# Patient Record
Sex: FEMALE | Race: ASIAN | NOT HISPANIC OR LATINO | URBAN - METROPOLITAN AREA
[De-identification: names, ages, dates, MRNs, and addresses within clinical notes are randomized per-mention and may not be internally consistent; named-entity substitution may affect disease eponyms.]

---

## 2023-06-12 ENCOUNTER — EMERGENCY (EMERGENCY)
Facility: HOSPITAL | Age: 23
LOS: 0 days | Discharge: HOME | End: 2023-06-13
Attending: EMERGENCY MEDICINE
Payer: MEDICAID

## 2023-06-12 VITALS
HEART RATE: 102 BPM | RESPIRATION RATE: 20 BRPM | OXYGEN SATURATION: 100 % | DIASTOLIC BLOOD PRESSURE: 76 MMHG | TEMPERATURE: 99 F | WEIGHT: 119.93 LBS | SYSTOLIC BLOOD PRESSURE: 123 MMHG

## 2023-06-12 DIAGNOSIS — L50.9 URTICARIA, UNSPECIFIED: ICD-10-CM
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
## 2023-06-12 DIAGNOSIS — T78.40XA ALLERGY, UNSPECIFIED, INITIAL ENCOUNTER: ICD-10-CM

## 2023-06-12 DIAGNOSIS — Z98.890 OTHER SPECIFIED POSTPROCEDURAL STATES: ICD-10-CM

## 2023-06-12 PROCEDURE — 99283 EMERGENCY DEPT VISIT LOW MDM: CPT

## 2023-06-12 PROCEDURE — 99284 EMERGENCY DEPT VISIT MOD MDM: CPT

## 2023-06-12 NOTE — ED PROVIDER NOTE - PHYSICAL EXAMINATION
CONSTITUTIONAL:  NAD;   SKIN: + urticarial rash across b/l arms and torso, otherwise skin intact, warm, dry;   HEAD:  NCAT;   EYES:  NL inspection;   ENT: posterior oropharynx clear, no edema, no exudates, uvula midline, airway patent, no stridor, no intraoral swelling, MMM;   NECK: supple; normal ROM; no swelling  CARD: no cyanosis  RESP:  CTAB; no increased work of breathing  ABD:  S/NT, no R/G;   MSK:  no extremity injury/deformity;   NEURO:  grossly unremarkable;   PSYCH:  cooperative, appropriate;

## 2023-06-12 NOTE — ED PROVIDER NOTE - PATIENT PORTAL LINK FT
You can access the FollowMyHealth Patient Portal offered by [facility] by registering at the following website: [email or web address]. By joining [employer]’s FollowMyHealth portal, you will also be able to view your health information using other applications (apps) compatible with our system.

## 2023-06-12 NOTE — ED PROVIDER NOTE - ATTENDING CONTRIBUTION TO CARE
Patient ready care after taking Keflex first time.  No other systemic involvement.  Well-appearing superficial urticaria otherwise exam unremarkable.    Patient seen with resident agree with above

## 2023-06-12 NOTE — ED PROVIDER NOTE - CARE PLAN
Assessment and plan of treatment:	Allergic reaction/urticaria    Supportive care change antibiotic follow-up outpatient return if worse   1 Principal Discharge DX:	Urticarial rash  Assessment and plan of treatment:	Allergic reaction/urticaria    Supportive care change antibiotic follow-up outpatient return if worse

## 2023-06-12 NOTE — ED PROVIDER NOTE - OBJECTIVE STATEMENT
Patient is a healthy 30 F with PMHx of recently diagnosed left great toe ingrown nail at outside [facility] (started on cephalexin, took 3 doses yesterday and today), otherwise healthy, p/w urticaria rash on bilateral arms and torso beginning tonight. Patient denies GERD pain, swelling, difficulty breathing or swallowing, abdominal pain, nausea/vomiting/diarrhea, or syncope.

## 2023-06-12 NOTE — ED PROVIDER NOTE - CLINICAL SUMMARY MEDICAL DECISION MAKING FREE TEXT BOX
Allergic reaction/urticaria    Supportive care change antibiotic follow-up outpatient return if worse

## 2023-06-12 NOTE — ED PROVIDER NOTE - NSFOLLOWUPINSTRUCTIONS_ED_ALL_ED_FT
Hives (Urticaria)    WHAT YOU NEED TO KNOW:    Urticaria is also called hives. Hives can change size and shape, and appear anywhere on your skin. They can be mild or severe and last from a few minutes to a few days. Hives may be a sign of a severe allergic reaction called anaphylaxis that needs immediate treatment. Urticaria that lasts longer than 6 weeks may be a chronic condition that needs long-term treatment.      DISCHARGE INSTRUCTIONS:    Call 911 for signs or symptoms of anaphylaxis, such as trouble breathing, swelling in your mouth or throat, or wheezing. You may also have itching, a rash, or feel like you are going to faint.    Return to the emergency department if:     Your heart is beating faster than it normally does.    You have cramping or severe pain in your abdomen.    Contact your healthcare provider if:     You have a fever.    Your skin still itches 24 hours after you take your medicine.    You still have hives after 7 days.    Your joints are painful and swollen.    You have questions or concerns about your condition or care.    Medicines:     Epinephrine is used to treat severe allergic reactions such as anaphylaxis.    Antihistamines decrease mild symptoms such as itching or a rash.    Steroids decrease redness, pain, and swelling.    Take your medicine as directed. Contact your healthcare provider if you think your medicine is not helping or if you have side effects. Tell him of her if you are allergic to any medicine. Keep a list of the medicines, vitamins, and herbs you take. Include the amounts, and when and why you take them. Bring the list or the pill bottles to follow-up visits. Carry your medicine list with you in case of an emergency.    Steps to take for signs or symptoms of anaphylaxis:     Immediately give 1 shot of epinephrine only into the outer thigh muscle.     Leave the shot in place as directed. Your healthcare provider may recommend you leave it in place for up to 10 seconds before you remove it. This helps make sure all of the epinephrine is delivered.     Call 911 and go to the emergency department, even if the shot improved symptoms. Do not drive yourself. Bring the used epinephrine shot with you.     Safety precautions to take if you are at risk for anaphylaxis:     Keep 2 shots of epinephrine with you at all times. You may need a second shot, because epinephrine only works for about 20 minutes and symptoms may return. Your healthcare provider can show you and family members how to give the shot. Check the expiration date every month and replace it before it expires.    Create an action plan. Your healthcare provider can help you create a written plan that explains the allergy and an emergency plan to treat a reaction. The plan explains when to give a second epinephrine shot if symptoms return or do not improve after the first. Give copies of the action plan and emergency instructions to family members, work and school staff, and  providers. Show them how to give a shot of epinephrine.    Be careful when you exercise. If you have had exercise-induced anaphylaxis, do not exercise right after you eat. Stop exercising right away if you start to develop any signs or symptoms of anaphylaxis. You may first feel tired, warm, or have itchy skin. Hives, swelling, and severe breathing problems may develop if you continue to exercise.    Carry medical alert identification. Wear medical alert jewelry or carry a card that explains the allergy. Ask your healthcare provider where to get these items. Medical Alert Jewelry    Keep a record of triggers and symptoms. Record everything you eat, drink, or apply to your skin for 3 weeks. Include stressful events and what you were doing right before your hives started. Bring the record with you to follow-up visits with your healthcare provider.    Manage urticaria:     Cool your skin. This may help decrease itching. Apply a cool pack to your hives. Dip a hand towel in cool water, wring it out, and place it on your hives. You may also soak your skin in a cool oatmeal bath.    Do not rub your hives. This can irritate your skin and cause more hives.    Wear loose clothing. Tight clothes may irritate your skin and cause more hives.    Manage stress. Stress may trigger hives, or make them worse. Learn new ways to relax, such as deep breathing.     Follow up with your healthcare provider as directed: Write down your questions so you remember to ask them during your visits.

## 2023-06-12 NOTE — ED PROVIDER NOTE - NSFOLLOWUPCLINICS_GEN_ALL_ED_FT
[facility]  Medicine  [address], [place]   Phone: [phone]  Fax:   Follow Up Time: Routine

## 2023-06-12 NOTE — ED ADULT TRIAGE NOTE - CHIEF COMPLAINT QUOTE
pt c/o rash to b/l arms, chest and back after taking cephalexin for the first time   denies diff breathing/swallowing, airway patent

## 2023-06-13 RX ORDER — DIPHENHYDRAMINE HCL 50 MG
50 CAPSULE ORAL ONCE
Refills: 0 | Status: COMPLETED | OUTPATIENT
Start: 2023-06-13 | End: 2023-06-13

## 2023-06-13 RX ADMIN — Medication 50 MILLIGRAM(S): at 00:29

## 2023-06-13 NOTE — ED ADULT NURSE NOTE - BIRTH SEX
Pt BIB mother for below complaitn.   Chief Complaint   Patient presents with   • Syncope     Pt was brushing her teeth and there was blood in the sink from losing her tooth this am. PT grabbed her head and fell backwards. PT hit back of head, skin intact. mother states pt had tremors and was not able to see for a couple of minutes. Pt had one bout of emesis after event. PERR     /49   Pulse 78   Temp 36.7 °C (98.1 °F) (Temporal)   Resp 22   Wt 22.6 kg (49 lb 13.2 oz)   SpO2 98%   Triage complete. Pt given zofran. Pt/Family educated on NPO status. Pt is alert, active, and age appropriate, NAD. Family educated on wait time and to update triage nurse with any changes.      Female

## 2023-06-13 NOTE — ED ADULT NURSE NOTE - NSFALLUNIVINTERV_ED_ALL_ED
Bed/Stretcher in lowest position, wheels locked, appropriate side rails in place/Call bell, personal items and telephone in reach/Instruct patient to call for assistance before getting out of bed/chair/stretcher/Non-slip footwear applied when patient is off stretcher/[place] to call system/Physically safe environment - no spills, clutter or unnecessary equipment/Purposeful proactive rounding/Room/bathroom lighting operational, light cord in reach

## 2023-09-08 ENCOUNTER — EMERGENCY (EMERGENCY)
Facility: HOSPITAL | Age: 23
LOS: 0 days | Discharge: HOME | End: 2023-09-08
Attending: EMERGENCY MEDICINE
Payer: MEDICAID

## 2023-09-08 VITALS
TEMPERATURE: 99 F | DIASTOLIC BLOOD PRESSURE: 84 MMHG | HEIGHT: 60 IN | SYSTOLIC BLOOD PRESSURE: 120 MMHG | HEART RATE: 115 BPM | OXYGEN SATURATION: 100 % | WEIGHT: 119.93 LBS | RESPIRATION RATE: 18 BRPM

## 2023-09-08 DIAGNOSIS — R31.9 HEMATURIA, UNSPECIFIED: ICD-10-CM

## 2023-09-08 DIAGNOSIS — N39.0 URINARY TRACT INFECTION, SITE NOT SPECIFIED: ICD-10-CM

## 2023-09-08 DIAGNOSIS — R30.0 DYSURIA: ICD-10-CM

## 2023-09-08 PROBLEM — Z78.9 OTHER SPECIFIED HEALTH STATUS: Chronic | Status: ACTIVE | Noted: 2023-06-13

## 2023-09-08 LAB
APPEARANCE UR: ABNORMAL
BACTERIA # UR AUTO: NEGATIVE /HPF — SIGNIFICANT CHANGE UP
BILIRUB UR-MCNC: NEGATIVE — SIGNIFICANT CHANGE UP
CAST: 0 /LPF — SIGNIFICANT CHANGE UP (ref 0–4)
COLOR SPEC: ABNORMAL
DIFF PNL FLD: ABNORMAL
GLUCOSE UR QL: NEGATIVE MG/DL — SIGNIFICANT CHANGE UP
KETONES UR-MCNC: NEGATIVE MG/DL — SIGNIFICANT CHANGE UP
LEUKOCYTE ESTERASE UR-ACNC: ABNORMAL
NITRITE UR-MCNC: NEGATIVE — SIGNIFICANT CHANGE UP
PH UR: 6 — SIGNIFICANT CHANGE UP (ref 5–8)
PROT UR-MCNC: 300 MG/DL
RBC CASTS # UR COMP ASSIST: 1253 /HPF — HIGH (ref 0–4)
SP GR SPEC: 1.01 — SIGNIFICANT CHANGE UP (ref 1–1.03)
SQUAMOUS # UR AUTO: 1 /HPF — SIGNIFICANT CHANGE UP (ref 0–5)
UROBILINOGEN FLD QL: 0.2 MG/DL — SIGNIFICANT CHANGE UP (ref 0.2–1)
WBC UR QL: 655 /HPF — HIGH (ref 0–5)

## 2023-09-08 PROCEDURE — 99283 EMERGENCY DEPT VISIT LOW MDM: CPT

## 2023-09-08 PROCEDURE — 87086 URINE CULTURE/COLONY COUNT: CPT

## 2023-09-08 PROCEDURE — 99284 EMERGENCY DEPT VISIT MOD MDM: CPT

## 2023-09-08 PROCEDURE — 81001 URINALYSIS AUTO W/SCOPE: CPT

## 2023-09-08 RX ORDER — CEFPODOXIME PROXETIL 100 MG
1 TABLET ORAL
Qty: 28 | Refills: 0
Start: 2023-09-08 | End: 2023-09-21

## 2023-09-08 RX ORDER — CEFPODOXIME PROXETIL 100 MG
1 TABLET ORAL
Qty: 20 | Refills: 0
Start: 2023-09-08 | End: 2023-09-17

## 2023-09-08 NOTE — ED ADULT NURSE NOTE - NS ED PATIENT SAFETY CONCERN
Occupational Therapy  Patient not seen in therapy today.     Pt not seen- awaiting post op shoe and also pending d/c back to [facility] today       OBJECTIVE                   Documented in the chart in the following areas: Assessment. Plan.      Therapy procedure time and total treatment time can be found documented on the Time Entry flowsheet   No

## 2023-09-08 NOTE — ED ADULT TRIAGE NOTE - CHIEF COMPLAINT QUOTE
Patient complaining of vaginal bleeding x1week and now associated with pelvic pain. Patient denies clots, heavy bleeding and dizziness. LMP 08/19.

## 2023-09-08 NOTE — ED PROVIDER NOTE - PATIENT PORTAL LINK FT
You can access the FollowMyHealth Patient Portal offered by [facility] by registering at the following website: [email or web address]. By joining [employer]’s FollowMyHealth portal, you will also be able to view your health information using other applications (apps) compatible with our system.

## 2023-09-08 NOTE — ED ADULT NURSE NOTE - NSFALLUNIVINTERV_ED_ALL_ED
Bed/Stretcher in lowest position, wheels locked, appropriate side rails in place/Call bell, personal items and telephone in reach/Instruct patient to call for assistance before getting out of bed/chair/stretcher/Non-slip footwear applied when patient is off stretcher/[place] to call system/Physically safe environment - no spills, clutter or unnecessary equipment/Purposeful proactive rounding/Room/bathroom lighting operational, light cord in reach

## 2023-09-08 NOTE — ED PROVIDER NOTE - OBJECTIVE STATEMENT
3-year-old female no notable past medical history coming in with complaints of blood in her urine.  Patient reports that for the past 5 days she has had some dysuria and suprapubic pain while she was visiting [facility], diagnosed with UTI and given cephalexin and metronidazole which helped her symptoms, however they returned after a couple of days when she became dehydrated.  Now with some blood/pink streaks in her urine.  Denies any sexual activity.  LMP 8/16.

## 2023-09-08 NOTE — ED PROVIDER NOTE - ATTENDING CONTRIBUTION TO CARE
23-year-old female presents for evaluation of blood in urine.  States for the past few days she has been having dysuria and suprapubic discomfort, diagnosed with UTI and given cephalexin and metronidazole which improved symptoms.  Patient states she took on and off but did not take them as directed preferring to take once a day instead of as prescribed.  States she just returned from [place] and symptoms have returned after couple days.  Feels like she is dehydrated and has some pink streaks in her urine when wiping.  Denies any sexual activity or history of STI.  No falls or trauma.  LMP 8/16/2023.  Denies any abdominal pain, flank pain, fevers, chills, joint pain, rashes or weakness.  Tolerating p.o. as usual with normal appetite.     VITAL SIGNS: noted  CONSTITUTIONAL: Well-developed; well-nourished; in no acute distress  HEAD: Normocephalic; atraumatic  EYES: PERRL, EOM intact; conjunctiva and sclera clear  ENT: No nasal discharge; airway clear. MMM  NECK: Supple; non tender. No anterior cervical lymphadenopathy noted  CARD: S1, S2 normal; no murmurs, gallops, or rubs. Regular rate and rhythm  RESP: CTAB/L, no wheezes, rales or rhonchi  ABD: Normal bowel sounds; soft; non-distended; non-tender; no hepatosplenomegaly. No CVA tenderness  EXT: Normal ROM. No calf tenderness or edema. Distal pulses intact  NEURO: Alert, oriented. Grossly unremarkable. No focal deficits  SKIN: Skin exam is warm and dry, no acute rash  MS: No midline spinal tenderness

## 2023-09-08 NOTE — ED PROVIDER NOTE - CLINICAL SUMMARY MEDICAL DECISION MAKING FREE TEXT BOX
Patient evaluated for dysuria, found to have symptoms of UTI on urinalysis.  Culture sent.  Patient prescribed medication and advised to take as directed and finish complete course.  Recommended close follow-up with PMD for reevaluation and patient agreed.  Strict return precautions advised and patient verbalized understanding.

## 2023-09-08 NOTE — ED PROVIDER NOTE - NSFOLLOWUPINSTRUCTIONS_ED_ALL_ED_FT
Please follow-up with PCP in 1 to 3 days. Return precautions explained in full to patient/family.    Urinary Tract Infection    You were seen and evelauted in the Emergency Department. It was found to you have a Urinary Tract Infection (UTI). It has been determined that it is safe for you to be discharged and attempt outpatient management. Please take antibiotic as prescribed and monitor your symptoms. If your antibiotic needs to be changed based on test results, you will be contacted. There is a chance you may have to return for re-evaluation and possible admission if oral antibiotics are not working. Please monitor your symptoms and see how you feel after 48 hours of antibiotics, unless one of the strict return precautions we discussed happens. Follow up with your doctor and bring all of your results. You may require further work up and management, which may include evaluaiton by a specialist (Urologist)    Overview  A urinary tract infection, or UTI, is a general term for an infection anywhere between the kidneys and the urethra (where urine comes out). Most UTIs are bladder infections. They often cause pain or burning when you urinate.    UTIs are caused by bacteria and can be cured with antibiotics. Be sure to complete your treatment so that the infection does not get worse.    Follow-up care is a key part of your treatment and safety. Be sure to make and go to all appointments, and call your doctor or nurse call line if you are having problems. It's also a good idea to know your test results and keep a list of the medicines you take.    How can you care for yourself at home?  Take your antibiotics as directed. Do not stop taking them just because you feel better. You need to take the full course of antibiotics.  Drink extra water and other fluids for the next day or two. This will help make the urine less concentrated and help wash out the bacteria that are causing the infection. (If you have kidney, heart, or liver disease and have to limit fluids, talk with your doctor before you increase the amount of fluids you drink.)  Avoid drinks that are carbonated or have caffeine. They can irritate the bladder.  Urinate often. Try to empty your bladder each time.  To relieve pain, take a hot bath or lay a heating pad set on low over your lower belly or genital area. Never go to sleep with a heating pad in place.  To prevent UTIs  Drink plenty of water each day. This helps you urinate often, which clears bacteria from your system. (If you have kidney, heart, or liver disease and have to limit fluids, talk with your doctor before you increase the amount of fluids you drink.)  Urinate when you need to.  If you are sexually active, urinate right after you have sex.  Change sanitary pads often.  Avoid douches, bubble baths, feminine hygiene sprays, and other feminine hygiene products that have deodorants.  After you use the toilet, wipe from front to back.  When should you call for help?  	  Call your doctor or nurse call line now or seek immediate medical care if:    Symptoms such as fever, chills, nausea, or vomiting get worse or appear for the first time.  You have new pain in your back just below your rib cage. This is called flank pain.  There is new blood or pus in your urine.  You have any problems with your antibiotic medicine.  Watch closely for changes in your health, and be sure to contact your doctor or nurse call line if:    You do not feel better after 2 days on an antibiotic.  Your symptoms go away but then come back.

## 2023-09-08 NOTE — ED PROVIDER NOTE - PHYSICAL EXAMINATION
CONSTITUTIONAL: NAD  SKIN: Warm dry  HEAD: NCAT  EYES: NL inspection  ENT: MMM  NECK: Supple; non tender.  CARD: RRR  RESP: CTAB  ABD: S/NT no R/G, no CVAT  NEURO: Grossly unremarkable  PSYCH: Cooperative, appropriate.

## 2023-09-09 LAB
CULTURE RESULTS: SIGNIFICANT CHANGE UP
SPECIMEN SOURCE: SIGNIFICANT CHANGE UP

## 2024-07-06 ENCOUNTER — INPATIENT (INPATIENT)
Facility: HOSPITAL | Age: 24
LOS: 3 days | Discharge: HOME | DRG: 424 | End: 2024-07-10
Attending: INTERNAL MEDICINE | Admitting: INTERNAL MEDICINE
Payer: MEDICAID

## 2024-07-06 VITALS
RESPIRATION RATE: 18 BRPM | WEIGHT: 110.01 LBS | DIASTOLIC BLOOD PRESSURE: 75 MMHG | TEMPERATURE: 98 F | HEART RATE: 112 BPM | SYSTOLIC BLOOD PRESSURE: 106 MMHG | OXYGEN SATURATION: 99 %

## 2024-07-06 PROCEDURE — 99285 EMERGENCY DEPT VISIT HI MDM: CPT

## 2024-07-06 RX ORDER — FAMOTIDINE 40 MG
20 TABLET ORAL ONCE
Refills: 0 | Status: COMPLETED | OUTPATIENT
Start: 2024-07-06 | End: 2024-07-06

## 2024-07-06 RX ORDER — SODIUM CHLORIDE 0.9 % (FLUSH) 0.9 %
2000 SYRINGE (ML) INJECTION ONCE
Refills: 0 | Status: COMPLETED | OUTPATIENT
Start: 2024-07-06 | End: 2024-07-06

## 2024-07-06 RX ORDER — ONDANSETRON HYDROCHLORIDE 2 MG/ML
4 INJECTION INTRAMUSCULAR; INTRAVENOUS ONCE
Refills: 0 | Status: COMPLETED | OUTPATIENT
Start: 2024-07-06 | End: 2024-07-07

## 2024-07-07 DIAGNOSIS — E87.1 HYPO-OSMOLALITY AND HYPONATREMIA: ICD-10-CM

## 2024-07-07 LAB
ALBUMIN SERPL ELPH-MCNC: 4 G/DL — SIGNIFICANT CHANGE UP (ref 3.5–5.2)
ALBUMIN SERPL ELPH-MCNC: 5 G/DL — SIGNIFICANT CHANGE UP (ref 3.5–5.2)
ALP SERPL-CCNC: 61 U/L — SIGNIFICANT CHANGE UP (ref 30–115)
ALP SERPL-CCNC: 68 U/L — SIGNIFICANT CHANGE UP (ref 30–115)
ALT FLD-CCNC: 42 U/L — HIGH (ref 0–41)
ALT FLD-CCNC: 48 U/L — HIGH (ref 0–41)
ANION GAP SERPL CALC-SCNC: 13 MMOL/L — SIGNIFICANT CHANGE UP (ref 7–14)
ANION GAP SERPL CALC-SCNC: 14 MMOL/L — SIGNIFICANT CHANGE UP (ref 7–14)
ANION GAP SERPL CALC-SCNC: 15 MMOL/L — HIGH (ref 7–14)
ANION GAP SERPL CALC-SCNC: 17 MMOL/L — HIGH (ref 7–14)
ANION GAP SERPL CALC-SCNC: 19 MMOL/L — HIGH (ref 7–14)
APPEARANCE UR: CLEAR — SIGNIFICANT CHANGE UP
APTT BLD: 35 SEC — SIGNIFICANT CHANGE UP (ref 27–39.2)
APTT BLD: 37.4 SEC — SIGNIFICANT CHANGE UP (ref 27–39.2)
AST SERPL-CCNC: 44 U/L — HIGH (ref 0–41)
AST SERPL-CCNC: 52 U/L — HIGH (ref 0–41)
B-OH-BUTYR SERPL-SCNC: 1.5 MMOL/L — HIGH
BASE EXCESS BLDA CALC-SCNC: -8.4 MMOL/L — LOW (ref -2–3)
BASE EXCESS BLDV CALC-SCNC: -11.6 MMOL/L — LOW (ref -2–3)
BASOPHILS # BLD AUTO: 0.01 K/UL — SIGNIFICANT CHANGE UP (ref 0–0.2)
BASOPHILS # BLD AUTO: 0.02 K/UL — SIGNIFICANT CHANGE UP (ref 0–0.2)
BASOPHILS NFR BLD AUTO: 0.2 % — SIGNIFICANT CHANGE UP (ref 0–1)
BASOPHILS NFR BLD AUTO: 0.3 % — SIGNIFICANT CHANGE UP (ref 0–1)
BILIRUB SERPL-MCNC: 0.9 MG/DL — SIGNIFICANT CHANGE UP (ref 0.2–1.2)
BILIRUB SERPL-MCNC: 1.1 MG/DL — SIGNIFICANT CHANGE UP (ref 0.2–1.2)
BILIRUB UR-MCNC: NEGATIVE — SIGNIFICANT CHANGE UP
BUN SERPL-MCNC: 10 MG/DL — SIGNIFICANT CHANGE UP (ref 10–20)
BUN SERPL-MCNC: 13 MG/DL — SIGNIFICANT CHANGE UP (ref 10–20)
BUN SERPL-MCNC: 16 MG/DL — SIGNIFICANT CHANGE UP (ref 10–20)
CA-I SERPL-SCNC: 1.01 MMOL/L — LOW (ref 1.15–1.33)
CALCIUM SERPL-MCNC: 8.2 MG/DL — LOW (ref 8.4–10.5)
CALCIUM SERPL-MCNC: 9 MG/DL — SIGNIFICANT CHANGE UP (ref 8.4–10.5)
CALCIUM SERPL-MCNC: 9.1 MG/DL — SIGNIFICANT CHANGE UP (ref 8.4–10.4)
CALCIUM SERPL-MCNC: 9.2 MG/DL — SIGNIFICANT CHANGE UP (ref 8.4–10.4)
CALCIUM SERPL-MCNC: 9.7 MG/DL — SIGNIFICANT CHANGE UP (ref 8.4–10.5)
CHLORIDE SERPL-SCNC: 76 MMOL/L — LOW (ref 98–110)
CHLORIDE SERPL-SCNC: 85 MMOL/L — LOW (ref 98–110)
CHLORIDE SERPL-SCNC: 88 MMOL/L — LOW (ref 98–110)
CHLORIDE SERPL-SCNC: 89 MMOL/L — LOW (ref 98–110)
CHLORIDE SERPL-SCNC: 90 MMOL/L — LOW (ref 98–110)
CHOLEST SERPL-MCNC: 73 MG/DL — SIGNIFICANT CHANGE UP
CO2 SERPL-SCNC: 14 MMOL/L — LOW (ref 17–32)
CO2 SERPL-SCNC: 16 MMOL/L — LOW (ref 17–32)
CO2 SERPL-SCNC: 17 MMOL/L — SIGNIFICANT CHANGE UP (ref 17–32)
COLOR SPEC: YELLOW — SIGNIFICANT CHANGE UP
CREAT ?TM UR-MCNC: 9 MG/DL — SIGNIFICANT CHANGE UP
CREAT SERPL-MCNC: 0.5 MG/DL — LOW (ref 0.7–1.5)
CREAT SERPL-MCNC: 0.6 MG/DL — LOW (ref 0.7–1.5)
CREAT SERPL-MCNC: 0.6 MG/DL — LOW (ref 0.7–1.5)
DIFF PNL FLD: NEGATIVE — SIGNIFICANT CHANGE UP
EGFR: 130 ML/MIN/1.73M2 — SIGNIFICANT CHANGE UP
EGFR: 130 ML/MIN/1.73M2 — SIGNIFICANT CHANGE UP
EGFR: 136 ML/MIN/1.73M2 — SIGNIFICANT CHANGE UP
EOSINOPHIL # BLD AUTO: 0.03 K/UL — SIGNIFICANT CHANGE UP (ref 0–0.7)
EOSINOPHIL # BLD AUTO: 0.07 K/UL — SIGNIFICANT CHANGE UP (ref 0–0.7)
EOSINOPHIL NFR BLD AUTO: 0.6 % — SIGNIFICANT CHANGE UP (ref 0–8)
EOSINOPHIL NFR BLD AUTO: 1 % — SIGNIFICANT CHANGE UP (ref 0–8)
GAS PNL BLDV: 114 MMOL/L — CRITICAL LOW (ref 136–145)
GAS PNL BLDV: SIGNIFICANT CHANGE UP
GAS PNL BLDV: SIGNIFICANT CHANGE UP
GLUCOSE BLDC GLUCOMTR-MCNC: 124 MG/DL — HIGH (ref 70–99)
GLUCOSE SERPL-MCNC: 67 MG/DL — LOW (ref 70–99)
GLUCOSE SERPL-MCNC: 69 MG/DL — LOW (ref 70–99)
GLUCOSE SERPL-MCNC: 72 MG/DL — SIGNIFICANT CHANGE UP (ref 70–99)
GLUCOSE SERPL-MCNC: 75 MG/DL — SIGNIFICANT CHANGE UP (ref 70–99)
GLUCOSE SERPL-MCNC: 82 MG/DL — SIGNIFICANT CHANGE UP (ref 70–99)
GLUCOSE UR QL: NEGATIVE MG/DL — SIGNIFICANT CHANGE UP
HCG SERPL QL: NEGATIVE — SIGNIFICANT CHANGE UP
HCG SERPL-ACNC: <1 MIU/ML — SIGNIFICANT CHANGE UP
HCO3 BLDA-SCNC: 16 MMOL/L — LOW (ref 21–28)
HCO3 BLDV-SCNC: 16 MMOL/L — LOW (ref 22–29)
HCT VFR BLD CALC: 36.2 % — LOW (ref 37–47)
HCT VFR BLD CALC: 39.9 % — SIGNIFICANT CHANGE UP (ref 37–47)
HCT VFR BLDA CALC: 35 % — SIGNIFICANT CHANGE UP (ref 34.5–46.5)
HDLC SERPL-MCNC: 41 MG/DL — LOW
HGB BLD CALC-MCNC: 11.5 G/DL — LOW (ref 11.7–16.1)
HGB BLD-MCNC: 13.2 G/DL — SIGNIFICANT CHANGE UP (ref 12–16)
HGB BLD-MCNC: 15 G/DL — SIGNIFICANT CHANGE UP (ref 12–16)
HOROWITZ INDEX BLDA+IHG-RTO: 21 — SIGNIFICANT CHANGE UP
IMM GRANULOCYTES NFR BLD AUTO: 0.2 % — SIGNIFICANT CHANGE UP (ref 0.1–0.3)
IMM GRANULOCYTES NFR BLD AUTO: 0.3 % — SIGNIFICANT CHANGE UP (ref 0.1–0.3)
INR BLD: 1.25 RATIO — SIGNIFICANT CHANGE UP (ref 0.65–1.3)
INR BLD: 1.33 RATIO — HIGH (ref 0.65–1.3)
KETONES UR-MCNC: 40 MG/DL
LACTATE BLDV-MCNC: 1.3 MMOL/L — SIGNIFICANT CHANGE UP (ref 0.5–2)
LEUKOCYTE ESTERASE UR-ACNC: NEGATIVE — SIGNIFICANT CHANGE UP
LIDOCAIN IGE QN: 23 U/L — SIGNIFICANT CHANGE UP (ref 7–60)
LIPID PNL WITH DIRECT LDL SERPL: 22 MG/DL — SIGNIFICANT CHANGE UP
LYMPHOCYTES # BLD AUTO: 2.56 K/UL — SIGNIFICANT CHANGE UP (ref 1.2–3.4)
LYMPHOCYTES # BLD AUTO: 3.32 K/UL — SIGNIFICANT CHANGE UP (ref 1.2–3.4)
LYMPHOCYTES # BLD AUTO: 46.9 % — SIGNIFICANT CHANGE UP (ref 20.5–51.1)
LYMPHOCYTES # BLD AUTO: 48.3 % — SIGNIFICANT CHANGE UP (ref 20.5–51.1)
MCHC RBC-ENTMCNC: 30.1 PG — SIGNIFICANT CHANGE UP (ref 27–31)
MCHC RBC-ENTMCNC: 30.1 PG — SIGNIFICANT CHANGE UP (ref 27–31)
MCHC RBC-ENTMCNC: 36.5 G/DL — SIGNIFICANT CHANGE UP (ref 32–37)
MCHC RBC-ENTMCNC: 37.6 G/DL — HIGH (ref 32–37)
MCV RBC AUTO: 80.1 FL — LOW (ref 81–99)
MCV RBC AUTO: 82.5 FL — SIGNIFICANT CHANGE UP (ref 81–99)
MONOCYTES # BLD AUTO: 0.61 K/UL — HIGH (ref 0.1–0.6)
MONOCYTES # BLD AUTO: 0.62 K/UL — HIGH (ref 0.1–0.6)
MONOCYTES NFR BLD AUTO: 11.5 % — HIGH (ref 1.7–9.3)
MONOCYTES NFR BLD AUTO: 8.8 % — SIGNIFICANT CHANGE UP (ref 1.7–9.3)
NEUTROPHILS # BLD AUTO: 2.08 K/UL — SIGNIFICANT CHANGE UP (ref 1.4–6.5)
NEUTROPHILS # BLD AUTO: 3.03 K/UL — SIGNIFICANT CHANGE UP (ref 1.4–6.5)
NEUTROPHILS NFR BLD AUTO: 39.2 % — LOW (ref 42.2–75.2)
NEUTROPHILS NFR BLD AUTO: 42.7 % — SIGNIFICANT CHANGE UP (ref 42.2–75.2)
NITRITE UR-MCNC: NEGATIVE — SIGNIFICANT CHANGE UP
NON HDL CHOLESTEROL: 32 MG/DL — SIGNIFICANT CHANGE UP
NRBC # BLD: 0 /100 WBCS — SIGNIFICANT CHANGE UP (ref 0–0)
NRBC # BLD: 0 /100 WBCS — SIGNIFICANT CHANGE UP (ref 0–0)
OSMOLALITY SERPL: 243 MOS/KG — LOW (ref 275–300)
OSMOLALITY UR: 154 MOS/KG — SIGNIFICANT CHANGE UP (ref 50–1200)
PCO2 BLDA: 31 MMHG — LOW (ref 32–45)
PCO2 BLDV: 39 MMHG — SIGNIFICANT CHANGE UP (ref 39–42)
PH BLDA: 7.33 — LOW (ref 7.35–7.45)
PH BLDV: 7.21 — LOW (ref 7.32–7.43)
PH UR: 6 — SIGNIFICANT CHANGE UP (ref 5–8)
PLATELET # BLD AUTO: 193 K/UL — SIGNIFICANT CHANGE UP (ref 130–400)
PLATELET # BLD AUTO: 223 K/UL — SIGNIFICANT CHANGE UP (ref 130–400)
PMV BLD: 10.4 FL — SIGNIFICANT CHANGE UP (ref 7.4–10.4)
PMV BLD: 10.4 FL — SIGNIFICANT CHANGE UP (ref 7.4–10.4)
PO2 BLDA: 38 MMHG — CRITICAL LOW (ref 83–108)
PO2 BLDV: 23 MMHG — LOW (ref 25–45)
POTASSIUM BLDV-SCNC: 4.4 MMOL/L — SIGNIFICANT CHANGE UP (ref 3.5–5.1)
POTASSIUM SERPL-MCNC: 4 MMOL/L — SIGNIFICANT CHANGE UP (ref 3.5–5)
POTASSIUM SERPL-MCNC: 4.5 MMOL/L — SIGNIFICANT CHANGE UP (ref 3.5–5)
POTASSIUM SERPL-MCNC: 4.9 MMOL/L — SIGNIFICANT CHANGE UP (ref 3.5–5)
POTASSIUM SERPL-MCNC: 5.1 MMOL/L — HIGH (ref 3.5–5)
POTASSIUM SERPL-MCNC: 6 MMOL/L — CRITICAL HIGH (ref 3.5–5)
POTASSIUM SERPL-SCNC: 4 MMOL/L — SIGNIFICANT CHANGE UP (ref 3.5–5)
POTASSIUM SERPL-SCNC: 4.5 MMOL/L — SIGNIFICANT CHANGE UP (ref 3.5–5)
POTASSIUM SERPL-SCNC: 4.9 MMOL/L — SIGNIFICANT CHANGE UP (ref 3.5–5)
POTASSIUM SERPL-SCNC: 5.1 MMOL/L — HIGH (ref 3.5–5)
POTASSIUM SERPL-SCNC: 6 MMOL/L — CRITICAL HIGH (ref 3.5–5)
POTASSIUM UR-SCNC: 9 MMOL/L — SIGNIFICANT CHANGE UP
PROT ?TM UR-MCNC: <5 MG/DLG/24H — SIGNIFICANT CHANGE UP
PROT SERPL-MCNC: 6.5 G/DL — SIGNIFICANT CHANGE UP (ref 6–8)
PROT SERPL-MCNC: 7.7 G/DL — SIGNIFICANT CHANGE UP (ref 6–8)
PROT UR-MCNC: NEGATIVE MG/DL — SIGNIFICANT CHANGE UP
PROT/CREAT UR-RTO: <0.6 RATIO — HIGH (ref 0–0.2)
PROTHROM AB SERPL-ACNC: 14.3 SEC — HIGH (ref 9.95–12.87)
PROTHROM AB SERPL-ACNC: 15.2 SEC — HIGH (ref 9.95–12.87)
RBC # BLD: 4.39 M/UL — SIGNIFICANT CHANGE UP (ref 4.2–5.4)
RBC # BLD: 4.98 M/UL — SIGNIFICANT CHANGE UP (ref 4.2–5.4)
RBC # FLD: 12.1 % — SIGNIFICANT CHANGE UP (ref 11.5–14.5)
RBC # FLD: 12.4 % — SIGNIFICANT CHANGE UP (ref 11.5–14.5)
SAO2 % BLDA: 66.3 % — LOW (ref 94–98)
SAO2 % BLDV: 23.9 % — LOW (ref 67–88)
SODIUM SERPL-SCNC: 110 MMOL/L — CRITICAL LOW (ref 135–146)
SODIUM SERPL-SCNC: 115 MMOL/L — CRITICAL LOW (ref 135–146)
SODIUM SERPL-SCNC: 120 MMOL/L — LOW (ref 135–146)
SODIUM SERPL-SCNC: 121 MMOL/L — LOW (ref 135–146)
SODIUM SERPL-SCNC: 121 MMOL/L — LOW (ref 135–146)
SODIUM UR-SCNC: 39 MMOL/L — SIGNIFICANT CHANGE UP
SODIUM UR-SCNC: 93 MMOL/L — SIGNIFICANT CHANGE UP
SP GR SPEC: 1 — SIGNIFICANT CHANGE UP (ref 1–1.03)
TRIGL SERPL-MCNC: 49 MG/DL — SIGNIFICANT CHANGE UP
UROBILINOGEN FLD QL: 0.2 MG/DL — SIGNIFICANT CHANGE UP (ref 0.2–1)
UUN UR-MCNC: 126 MG/DL — SIGNIFICANT CHANGE UP
WBC # BLD: 5.3 K/UL — SIGNIFICANT CHANGE UP (ref 4.8–10.8)
WBC # BLD: 7.08 K/UL — SIGNIFICANT CHANGE UP (ref 4.8–10.8)
WBC # FLD AUTO: 5.3 K/UL — SIGNIFICANT CHANGE UP (ref 4.8–10.8)
WBC # FLD AUTO: 7.08 K/UL — SIGNIFICANT CHANGE UP (ref 4.8–10.8)

## 2024-07-07 PROCEDURE — 80354 DRUG SCREENING FENTANYL: CPT

## 2024-07-07 PROCEDURE — 84540 ASSAY OF URINE/UREA-N: CPT

## 2024-07-07 PROCEDURE — 83935 ASSAY OF URINE OSMOLALITY: CPT

## 2024-07-07 PROCEDURE — 83930 ASSAY OF BLOOD OSMOLALITY: CPT

## 2024-07-07 PROCEDURE — 81003 URINALYSIS AUTO W/O SCOPE: CPT

## 2024-07-07 PROCEDURE — 85027 COMPLETE CBC AUTOMATED: CPT

## 2024-07-07 PROCEDURE — 94760 N-INVAS EAR/PLS OXIMETRY 1: CPT

## 2024-07-07 PROCEDURE — 82024 ASSAY OF ACTH: CPT

## 2024-07-07 PROCEDURE — 82330 ASSAY OF CALCIUM: CPT

## 2024-07-07 PROCEDURE — 82962 GLUCOSE BLOOD TEST: CPT

## 2024-07-07 PROCEDURE — 85730 THROMBOPLASTIN TIME PARTIAL: CPT

## 2024-07-07 PROCEDURE — 83735 ASSAY OF MAGNESIUM: CPT

## 2024-07-07 PROCEDURE — 82570 ASSAY OF URINE CREATININE: CPT

## 2024-07-07 PROCEDURE — 85025 COMPLETE CBC W/AUTO DIFF WBC: CPT

## 2024-07-07 PROCEDURE — 87040 BLOOD CULTURE FOR BACTERIA: CPT

## 2024-07-07 PROCEDURE — 84156 ASSAY OF PROTEIN URINE: CPT

## 2024-07-07 PROCEDURE — 84449 ASSAY OF TRANSCORTIN: CPT

## 2024-07-07 PROCEDURE — 82010 KETONE BODYS QUAN: CPT

## 2024-07-07 PROCEDURE — 83605 ASSAY OF LACTIC ACID: CPT

## 2024-07-07 PROCEDURE — 84443 ASSAY THYROID STIM HORMONE: CPT

## 2024-07-07 PROCEDURE — 85610 PROTHROMBIN TIME: CPT

## 2024-07-07 PROCEDURE — 84133 ASSAY OF URINE POTASSIUM: CPT

## 2024-07-07 PROCEDURE — 84300 ASSAY OF URINE SODIUM: CPT

## 2024-07-07 PROCEDURE — 93005 ELECTROCARDIOGRAM TRACING: CPT

## 2024-07-07 PROCEDURE — 80307 DRUG TEST PRSMV CHEM ANLYZR: CPT

## 2024-07-07 PROCEDURE — 99291 CRITICAL CARE FIRST HOUR: CPT

## 2024-07-07 PROCEDURE — 93010 ELECTROCARDIOGRAM REPORT: CPT

## 2024-07-07 PROCEDURE — 85018 HEMOGLOBIN: CPT

## 2024-07-07 PROCEDURE — 84702 CHORIONIC GONADOTROPIN TEST: CPT

## 2024-07-07 PROCEDURE — 84295 ASSAY OF SERUM SODIUM: CPT

## 2024-07-07 PROCEDURE — 82803 BLOOD GASES ANY COMBINATION: CPT

## 2024-07-07 PROCEDURE — 36415 COLL VENOUS BLD VENIPUNCTURE: CPT

## 2024-07-07 PROCEDURE — 85014 HEMATOCRIT: CPT

## 2024-07-07 PROCEDURE — 80053 COMPREHEN METABOLIC PANEL: CPT

## 2024-07-07 PROCEDURE — 83516 IMMUNOASSAY NONANTIBODY: CPT

## 2024-07-07 PROCEDURE — 80061 LIPID PANEL: CPT

## 2024-07-07 PROCEDURE — 82533 TOTAL CORTISOL: CPT

## 2024-07-07 PROCEDURE — 71045 X-RAY EXAM CHEST 1 VIEW: CPT

## 2024-07-07 PROCEDURE — 80048 BASIC METABOLIC PNL TOTAL CA: CPT

## 2024-07-07 PROCEDURE — 84132 ASSAY OF SERUM POTASSIUM: CPT

## 2024-07-07 PROCEDURE — 93307 TTE W/O DOPPLER COMPLETE: CPT

## 2024-07-07 PROCEDURE — 84100 ASSAY OF PHOSPHORUS: CPT

## 2024-07-07 RX ORDER — DEXTROSE 30 % IN WATER 30 %
50 VIAL (ML) INTRAVENOUS ONCE
Refills: 0 | Status: COMPLETED | OUTPATIENT
Start: 2024-07-07 | End: 2024-07-07

## 2024-07-07 RX ORDER — ONDANSETRON HYDROCHLORIDE 2 MG/ML
4 INJECTION INTRAMUSCULAR; INTRAVENOUS EVERY 8 HOURS
Refills: 0 | Status: DISCONTINUED | OUTPATIENT
Start: 2024-07-07 | End: 2024-07-10

## 2024-07-07 RX ORDER — INSULIN REGULAR, HUMAN 100/ML
10 VIAL (ML) INJECTION ONCE
Refills: 0 | Status: COMPLETED | OUTPATIENT
Start: 2024-07-07 | End: 2024-07-07

## 2024-07-07 RX ORDER — SODIUM CHLORIDE 0.9 % (FLUSH) 0.9 %
1000 SYRINGE (ML) INJECTION
Refills: 0 | Status: DISCONTINUED | OUTPATIENT
Start: 2024-07-07 | End: 2024-07-07

## 2024-07-07 RX ORDER — ACETAMINOPHEN 325 MG
650 TABLET ORAL EVERY 6 HOURS
Refills: 0 | Status: DISCONTINUED | OUTPATIENT
Start: 2024-07-07 | End: 2024-07-10

## 2024-07-07 RX ORDER — SODIUM BICARBONATE 650 MG/1
0.07 TABLET ORAL
Qty: 50 | Refills: 0 | Status: DISCONTINUED | OUTPATIENT
Start: 2024-07-07 | End: 2024-07-08

## 2024-07-07 RX ORDER — SODIUM BICARBONATE 650 MG/1
650 TABLET ORAL EVERY 8 HOURS
Refills: 0 | Status: DISCONTINUED | OUTPATIENT
Start: 2024-07-07 | End: 2024-07-10

## 2024-07-07 RX ORDER — SODIUM ZIRCONIUM CYCLOSILICATE 10 G/10G
10 POWDER, FOR SUSPENSION ORAL ONCE
Refills: 0 | Status: COMPLETED | OUTPATIENT
Start: 2024-07-07 | End: 2024-07-07

## 2024-07-07 RX ORDER — ENOXAPARIN SODIUM 100 MG/ML
40 INJECTION SUBCUTANEOUS EVERY 24 HOURS
Refills: 0 | Status: DISCONTINUED | OUTPATIENT
Start: 2024-07-07 | End: 2024-07-10

## 2024-07-07 RX ADMIN — Medication 50 MILLILITER(S): at 05:30

## 2024-07-07 RX ADMIN — Medication 10 UNIT(S): at 14:29

## 2024-07-07 RX ADMIN — SODIUM ZIRCONIUM CYCLOSILICATE 10 GRAM(S): 10 POWDER, FOR SUSPENSION ORAL at 14:28

## 2024-07-07 RX ADMIN — SODIUM BICARBONATE 50 MEQ/KG/HR: 650 TABLET ORAL at 22:04

## 2024-07-07 RX ADMIN — Medication 650 MILLIGRAM(S): at 14:53

## 2024-07-07 RX ADMIN — Medication 50 MILLILITER(S): at 14:28

## 2024-07-07 RX ADMIN — Medication 50 MILLILITER(S): at 14:42

## 2024-07-07 RX ADMIN — Medication 650 MILLIGRAM(S): at 15:26

## 2024-07-07 RX ADMIN — SODIUM BICARBONATE 650 MILLIGRAM(S): 650 TABLET ORAL at 14:29

## 2024-07-07 RX ADMIN — Medication 1 APPLICATION(S): at 05:54

## 2024-07-07 RX ADMIN — SODIUM BICARBONATE 650 MILLIGRAM(S): 650 TABLET ORAL at 21:09

## 2024-07-07 RX ADMIN — Medication 2000 MILLILITER(S): at 00:19

## 2024-07-07 RX ADMIN — ONDANSETRON HYDROCHLORIDE 4 MILLIGRAM(S): 2 INJECTION INTRAMUSCULAR; INTRAVENOUS at 01:30

## 2024-07-07 RX ADMIN — ENOXAPARIN SODIUM 40 MILLIGRAM(S): 100 INJECTION SUBCUTANEOUS at 05:54

## 2024-07-07 NOTE — CONSULT NOTE ADULT - SUBJECTIVE AND OBJECTIVE BOX
NEPHROLOGY CONSULTATION NOTE    NADEEN ROJO  23y  Female  MRN-[id number]    CC:   Patient is a 23y old  Female who presents with a chief complaint of Hyponatremia (07 Jul 2024 08:54)      HPI:  23-year-old female with no significant past medical history presents complaining of decreased oral intake for three days, yesterday her father passed away and since then she developed nausea and vomiting with mild epigastric abdominal pain. She endorses 3 episodes of NBNB emesis as well as 1 episode of watery nonbloody diarrhea but then her symptoms improved slightly and she now denies diarrhea and says the abdominal pain improved but she has significant generalized weakness.  Reports she returned from [place] on June 17 but was not feeling sick after she returned. Denies fever/chills, chest pain, shortness of breath, dysuria/frequency/urgency/hematuria, vaginal bleeding/discharge, lightheadedness, dizziness, LOC, or abnormal limb movements.     Patient was hypotensive and tachycardic on presentation (according to sign out but recorded vitals do not show hypotension), on RA.  Labs significant for Na= 110, k=5.1, AG=15, AST=52, ALT=48, vbg Ph 7.21 improved to 7.33.     Patient admitted for further work up and management of hyponatremia and HAGMA.        (07 Jul 2024 03:46)      PAST MEDICAL & SURGICAL HISTORY:  No pertinent past medical history        Allergies:  No Known Allergies    Home Medications Reviewed  Hospital Medications:   MEDICATIONS  (STANDING):  chlorhexidine 2% Cloths 1 Application(s) Topical <User Schedule>  enoxaparin Injectable 40 milliGRAM(s) SubCutaneous every 24 hours  sodium chloride 0.9%. 1000 milliLiter(s) (50 mL/Hr) IV Continuous <Continuous>    MEDICATIONS  (PRN):  ondansetron Injectable 4 milliGRAM(s) IV Push every 8 hours PRN Nausea and/or Vomiting    Home Medications:      SOCIAL HISTORY:  Social History:      FAMILY HISTORY:      REVIEW OF SYSTEMS:  All other review of systems is negative unless indicated above.    VITALS:  T(F): 98.1 (07-07-24 @ 12:00), Max: 98.5 (07-06-24 @ 22:21)  HR: 131 (07-07-24 @ 12:00)  BP: 101/59 (07-07-24 @ 12:00)  RR: 25 (07-07-24 @ 12:00)  SpO2: 98% (07-07-24 @ 12:00)    Height (cm): 162.6 (07-07 @ 03:30)  Weight (kg): 49.9 (07-07 @ 03:30)  BMI (kg/m2): 18.9 (07-07 @ 03:30)  BSA (m2): 1.52 (07-07 @ 03:30)  I&O's Detail    06 Jul 2024 07:01  -  07 Jul 2024 07:00  --------------------------------------------------------  IN:    sodium chloride 0.9%: 150 mL  Total IN: 150 mL    OUT:    Voided (mL): 850 mL  Total OUT: 850 mL    Total NET: -700 mL      07 Jul 2024 07:01  -  07 Jul 2024 12:26  --------------------------------------------------------  IN:    sodium chloride 0.9%: 100 mL    sodium chloride 0.9%: 100 mL  Total IN: 200 mL    OUT:  Total OUT: 0 mL    Total NET: 200 mL            I&O's Summary    06 Jul 2024 07:01  -  07 Jul 2024 07:00  --------------------------------------------------------  IN: 150 mL / OUT: 850 mL / NET: -700 mL    07 Jul 2024 07:01  -  07 Jul 2024 12:26  --------------------------------------------------------  IN: 200 mL / OUT: 0 mL / NET: 200 mL        PHYSICAL EXAM:  Gen: NAD  resp: b/l breath sounds  abd: soft  ext: no edema    LABS:  Daily Height in cm: 162.56 (07 Jul 2024 03:30)    ABG - ( 07 Jul 2024 03:58 )  pH, Arterial: 7.33  pH, Blood: x     /  pCO2: 31    /  pO2: 38    / HCO3: 16    / Base Excess: -8.4  /  SaO2: 66.3      Blood Gas Arterial, Lactate: 0.9 mmol/L (07-07-24 @ 03:58)  Blood Gas Profile w/Lytes - Venous: Performed in Lab (07-07-24 @ 02:00)  Blood Gas Venous - Lactate: 1.3 mmol/L (07-07-24 @ 02:00)    07-07    115<LL>  |  85<L>  |  13  ----------------------------<  75  4.5   |  16<L>  |  0.5<L>    Ca    8.2<L>      07 Jul 2024 03:53    TPro  6.5  /  Alb  4.0  /  TBili  0.9  /  DBili      /  AST  44<H>  /  ALT  42<H>  /  AlkPhos  61  07-07      Creatinine Trend:   Creatinine: 0.5 mg/dL (07-07-24 @ 03:53)  Creatinine: 0.6 mg/dL (07-07-24 @ 00:06)                            15.0   7.08  )-----------( 223      ( 07 Jul 2024 00:06 )             39.9     Mean Cell Volume: 80.1 fL (07-07-24 @ 00:06)    Urine Studies:  Protein, Urine: Negative mg/dL (07-07-24 @ 05:40)      Sodium: 115 mmol/L (07-07-24 @ 03:53)  Sodium: 110 mmol/L (07-07-24 @ 00:06)    Osmolality, Random Urine: 154 mos/kg [50 - 1200] (07-07-24 @ 05:40)    Sodium, Random Urine: 93.0 mmoL/L (07-07-24 @ 11:31)  Sodium, Random Urine: 39.0 mmoL/L (07-07-24 @ 05:40)  Osmolality, Serum: 243 mos/kg [275 - 300] (07-07-24 @ 03:53)              RADIOLOGY & ADDITIONAL STUDIES:

## 2024-07-07 NOTE — ED ADULT NURSE NOTE - IN THE PAST 12 MONTHS HAVE YOU USED DRUGS OTHER THAN THOSE REQUIRED FOR MEDICAL REASON?
2155 pt 30yf aox4 co covid s/sx brought in pts daugher w/ positive covid results from [place] 2 days ago, c/o sore throat, fever at home 100.7 gen aches, pt vss in no acute distress able to verbalize concerns, pending eval/dispo
No

## 2024-07-07 NOTE — CONSULT NOTE ADULT - ASSESSMENT
IMPRESSION:    Acute hyponatremia, likely poor intake  Starvation ketoacidosis resolved      PLAN:    CNS: Avoid depressants. Drug screen    HEENT: Oral care    PULMONARY: On RA. HOB @ 45 degrees.  Aspiration precautions     CARDIOVASCULAR: Goal directed fluid resuscitation. Continue NS at 50.    GI: GI prophylaxis.  Feeding.  Bowel regimen PRN    RENAL: Avoid overcorrection. Monitor BMP. Nephrology consult. Follow up lytes. Correct as needed    INFECTIOUS DISEASE: Follow up cultures. Monitor off abx.    HEMATOLOGICAL:  DVT prophylaxis.    ENDOCRINE:  Follow up FS.  Insulin protocol if needed.    MUSCULOSKELETAL: AAT    ICU         IMPRESSION:    Acute hyponatremia, likely poor intake  Starvation ketoacidosis   HCO3 loss from vomiting      PLAN:    CNS: Avoid depressants. Drug screen    HEENT: Oral care    PULMONARY: On RA. HOB @ 45 degrees.  Aspiration precautions     CARDIOVASCULAR: Goal directed fluid resuscitation. Continue NS at 50.    GI: GI prophylaxis.  Feeding.  Bowel regimen PRN    RENAL: Avoid overcorrection. Monitor BMP. Nephrology consult. Follow up lytes. Correct as needed    INFECTIOUS DISEASE: Follow up cultures. Monitor off abx.    HEMATOLOGICAL:  DVT prophylaxis.    ENDOCRINE:  Follow up FS.  Insulin protocol if needed.    MUSCULOSKELETAL: AAT    ICU

## 2024-07-07 NOTE — H&P ADULT - NSHPREVIEWOFSYSTEMS_GEN_ALL_CORE
CONSTITUTIONAL: generalized weakness, no fevers or chills  EYES/ENT: No visual changes;  No vertigo or throat pain   NECK: No pain or stiffness  RESPIRATORY: No cough, wheezing, hemoptysis; No shortness of breath  CARDIOVASCULAR: No chest pain or palpitations  GASTROINTESTINAL: as in HPI  GENITOURINARY: No dysuria, frequency or hematuria  NEUROLOGICAL: No numbness or weakness  SKIN: No itching, rashes

## 2024-07-07 NOTE — CONSULT NOTE ADULT - SUBJECTIVE AND OBJECTIVE BOX
Patient is a 23y old  Female who presents with a chief complaint of Hyponatremia (2024 03:46)      HPI:  23-year-old female with no significant past medical history presents complaining of decreased oral intake for three days, yesterday her father passed away and since then she developed nausea and vomiting with mild epigastric abdominal pain. She endorses 3 episodes of NBNB emesis as well as 1 episode of watery nonbloody diarrhea but then her symptoms improved slightly and she now denies diarrhea and says the abdominal pain improved but she has significant generalized weakness.  Reports she returned from [place] on  but was not feeling sick after she returned. Denies fever/chills, chest pain, shortness of breath, dysuria/frequency/urgency/hematuria, vaginal bleeding/discharge, lightheadedness, dizziness, LOC, or abnormal limb movements.     Patient was hypotensive and tachycardic on presentation (according to sign out but recorded vitals do not show hypotension), on RA.  Labs significant for Na= 110, k=5.1, AG=15, AST=52, ALT=48, vbg Ph 7.21 improved to 7.33.     Patient admitted for further work up and management of hyponatremia and HAGMA.        (2024 03:46)      PAST MEDICAL & SURGICAL HISTORY:  No pertinent past medical history            FAMILY HISTORY:  :  No known cardiovacular family hisotry     Review Of Systems:     All ROS are negative except per HPI       Allergies    No Known Allergies    Intolerances          PHYSICAL EXAM    ICU Vital Signs Last 24 Hrs  T(C): 35.7 (2024 08:00), Max: 36.9 (2024 22:21)  T(F): 96.3 (2024 08:00), Max: 98.5 (2024 22:21)  HR: 112 (2024 08:00) (99 - 113)  BP: 90/65 (2024 08:00) (83/54 - 117/85)  BP(mean): 73 (2024 08:00) (64 - 75)  RR: 22 (2024 08:00) (18 - 22)  SpO2: 99% (2024 08:00) (98% - 100%)    O2 Parameters below as of 2024 08:00  Patient On (Oxygen Delivery Method): room air            CONSTITUTIONAL:  NAD    ENT:   Airway patent,     EYES:   pupils equal,   round and reactive to light.    CARDIAC:   Normal rate,   Regular rhythm.    Heart sounds S1, S2.     RESPIRATORY:   No wheezing   Normal chest expansion  No use of accessory muscles    GASTROINTESTINAL:  Abdomen soft   Non-tender,   No guarding,   + BS      MUSCULOSKELETAL:   Nno clubbing, cyanosis    NEUROLOGICAL:   Alert and oriented       SKIN:   Skin normal color for race,               24 @ 07:01  -  24 @ 07:00  --------------------------------------------------------  IN:    sodium chloride 0.9%: 150 mL  Total IN: 150 mL    OUT:    Voided (mL): 850 mL  Total OUT: 850 mL    Total NET: -700 mL      24 @ 07:01  -  24 @ 08:55  --------------------------------------------------------  IN:    sodium chloride 0.9%: 100 mL  Total IN: 100 mL    OUT:  Total OUT: 0 mL    Total NET: 100 mL          LABS:                          15.0   7.08  )-----------( 223      ( 2024 00:06 )             39.9                                                   115<LL>  |  85<L>  |  13  ----------------------------<  75  4.5   |  16<L>  |  0.5<L>    Ca    8.2<L>      2024 03:53    TPro  6.5  /  Alb  4.0  /  TBili  0.9  /  DBili  x   /  AST  44<H>  /  ALT  42<H>  /  AlkPhos  61  07-07      PT/INR - ( 2024 03:53 )   PT: 15.20 sec;   INR: 1.33 ratio         PTT - ( 2024 03:53 )  PTT:35.0 sec                                       Urinalysis Basic - ( 2024 05:40 )    Color: Yellow / Appearance: Clear / S.005 / pH: x  Gluc: x / Ketone: 40 mg/dL  / Bili: Negative / Urobili: 0.2 mg/dL   Blood: x / Protein: Negative mg/dL / Nitrite: Negative   Leuk Esterase: Negative / RBC: x / WBC x   Sq Epi: x / Non Sq Epi: x / Bacteria: x                                                  LIVER FUNCTIONS - ( 2024 03:53 )  Alb: 4.0 g/dL / Pro: 6.5 g/dL / ALK PHOS: 61 U/L / ALT: 42 U/L / AST: 44 U/L / GGT: x                                                  Urinalysis with Rflx Culture (collected 2024 05:40)                                                                                       ABG - ( 2024 03:58 )  pH, Arterial: 7.33  pH, Blood: x     /  pCO2: 31    /  pO2: 38    / HCO3: 16    / Base Excess: -8.4  /  SaO2: 66.3                X-Rays reviewed:                                                                                    ECHO    CXR interpreted by me:    MEDICATIONS  (STANDING):  chlorhexidine 2% Cloths 1 Application(s) Topical <User Schedule>  enoxaparin Injectable 40 milliGRAM(s) SubCutaneous every 24 hours  sodium chloride 0.9%. 1000 milliLiter(s) (50 mL/Hr) IV Continuous <Continuous>    MEDICATIONS  (PRN):  ondansetron Injectable 4 milliGRAM(s) IV Push every 8 hours PRN Nausea and/or Vomiting

## 2024-07-07 NOTE — ED PROVIDER NOTE - CLINICAL SUMMARY MEDICAL DECISION MAKING FREE TEXT BOX
24 yo woman w/ no pmhx here w/ 2 days of n/v/d  no fevers  nb/nb vomiting  no urinary symptoms    wd/wn  nad  rrr s1s2 wnl  cta b/l  nt/nd + BS  aao X 3    24 yo woman w/ n/v/d. reassuring abdo exam  lytes, beta, antiemetics, fluids and reassess 22 yo woman w/ no pmhx here w/ 2 days of n/v/d  no fevers  nb/nb vomiting  no urinary symptoms    wd/wn  nad  rrr s1s2 wnl  cta b/l  nt/nd + BS  aao X 3  neuro intact    22 yo woman w/ n/v/d. reassuring abdo exam    Labs resulted Na = 110. pt w/ no neuro symptoms. Discussed w/ ICU for admission  lytes, beta, antiemetics, fluids and reassess

## 2024-07-07 NOTE — ED PROVIDER NOTE - OBJECTIVE STATEMENT
23-year-old female with past medical history of reflux presents complaint of nausea and vomiting and diarrhea.  Reports she returned from [place] on June 17.  States she has had no symptoms until 3 days ago when she had persistent nausea.  Reports today she had 3 episodes NBNB emesis as well as 1 episode of watery nonbloody diarrhea.  States she felt a little bit better after vomiting.  Endorses epigastric discomfort.  Denies fever/chills, chest pain, shortness of breath, dysuria/frequency/urgency/hematuria, vaginal bleeding/discharge, lightheadedness, dizziness.

## 2024-07-07 NOTE — CONSULT NOTE ADULT - ASSESSMENT
Hypovolemic hyponatremia / iso poor po intake and vomiting / urine Na high was taken after pt received plenty of iv hydration, urine osm high for Na level/ was likely still hypovolemic  Starvation ketoacidosis  improving with iv hydration  - hyponatremia may be acute, but avoid over-correction to be safe  - Na increased by 5meq in 4hrs  - d/c NS / if needs d5w, can maintain on d5w with 1amp (50meq) of sodium bicarb for now to avoid over-correction  - monitor Na level qshift  - start sodium bicarb po 650meq q8h  - check TSH   Hypovolemic hyponatremia / iso poor po intake and vomiting / urine Na high was taken after pt received plenty of iv hydration, urine osm high for Na level/ was likely still hypovolemic  Starvation ketoacidosis  vomited yesterday likely d/t hyponatremia / drinks a lot of water on daily basis with low po intake  improving with iv hydration  - Na increased by 5meq in 4hrs  - d/c NS / if needs d5w, can maintain on d5w with 1amp (50meq) of sodium bicarb for now to avoid over-correction  - monitor Na level qshift  - start sodium bicarb po 650meq q8h  - will need to limit excessive fluid intake. for now restrict to 1.5L daily  - check TSH

## 2024-07-07 NOTE — H&P ADULT - NSHPPHYSICALEXAM_GEN_ALL_CORE
T(C): 36.2 (07-07-24 @ 03:30), Max: 36.9 (07-06-24 @ 22:21)  HR: 107 (07-07-24 @ 04:00) (107 - 113)  BP: 90/55 (07-07-24 @ 04:00) (90/55 - 117/85)  RR: 22 (07-07-24 @ 04:00) (18 - 22)  SpO2: 100% (07-07-24 @ 04:00) (99% - 100%)    CONSTITUTIONAL: Well groomed, no apparent distress  EYES: PERRLA and symmetric, EOMI, No conjunctival or scleral injection, non-icteric  ENMT: Oral mucosa with dry membranes. Normal dentition; no pharyngeal injection or exudates  NECK: Supple, symmetric and without tracheal deviation   RESP: No respiratory distress, no use of accessory muscles; CTA b/l, no WRR  CV: RRR, +S1S2, no MRG; no JVD; no peripheral edema  GI: Soft, mild generalized tenderness, ND, no rebound, no guarding; no palpable masses; no hepatosplenomegaly; no hernia palpated  LYMPH: No cervical LAD or tenderness; no axillary LAD or tenderness; no inguinal LAD or tenderness  MSK: Normal gait; No digital clubbing or cyanosis; examination of the (head/neck/spine/ribs/pelvis, RUE, LUE, RLE, LLE) without misalignment,   Normal ROM without pain, no spinal tenderness, normal muscle strength/tone  SKIN: No rashes or ulcers noted; no subcutaneous nodules or induration palpable  NEURO: CN II-XII intact; normal reflexes in upper and lower extremities, sensation intact in upper and lower extremities b/l to light touch   PSYCH: Appropriate insight/judgment; A+O x 3, depressed mood and affect,  recent/remote memory intact

## 2024-07-07 NOTE — H&P ADULT - HISTORY OF PRESENT ILLNESS
23-year-old female with no significant past medical history presents complaining of decreased oral intake for three days, yesterday her father passed away and since then she developed nausea and vomiting with mild epigastric abdominal pain. She endorses 3 episodes of NBNB emesis as well as 1 episode of watery nonbloody diarrhea but then her symptoms improved slightly and she now denies diarrhea and says the abdominal pain improved but she has significant generalized weakness.  Reports she returned from [place] on June 17 but was not feeling sick after she returned. Denies fever/chills, chest pain, shortness of breath, dysuria/frequency/urgency/hematuria, vaginal bleeding/discharge, lightheadedness, dizziness, LOC, or     Patient was hypotensive and tachycardic on presentation (according to sign out but recorded vitals do not show hypotension), on RA.  Labs significant for Na= 110, k=5.1, AG=15, AST=52, ALT=48, vbg Ph 7.21 improved to 7.33.     Patient admitted for further work up and management of hyponatremia and HAGMA.        23-year-old female with no significant past medical history presents complaining of decreased oral intake for three days, yesterday her father passed away and since then she developed nausea and vomiting with mild epigastric abdominal pain. She endorses 3 episodes of NBNB emesis as well as 1 episode of watery nonbloody diarrhea but then her symptoms improved slightly and she now denies diarrhea and says the abdominal pain improved but she has significant generalized weakness.  Reports she returned from [place] on June 17 but was not feeling sick after she returned. Denies fever/chills, chest pain, shortness of breath, dysuria/frequency/urgency/hematuria, vaginal bleeding/discharge, lightheadedness, dizziness, LOC, or abnormal limb movements.     Patient was hypotensive and tachycardic on presentation (according to sign out but recorded vitals do not show hypotension), on RA.  Labs significant for Na= 110, k=5.1, AG=15, AST=52, ALT=48, vbg Ph 7.21 improved to 7.33.     Patient admitted for further work up and management of hyponatremia and HAGMA.

## 2024-07-07 NOTE — ED PROVIDER NOTE - PHYSICAL EXAMINATION
Vital Signs: I have reviewed the initial vital signs.  Constitutional: appears stated age, no acute distress  Eyes: Sclera clear, EOMI.  Cardiovascular: S1 and S2, regular rate, regular rhythm, well-perfused extremities, radial pulses equal and 2+, pedal pulses 2+ and equal  Respiratory: unlabored respiratory effort, clear to auscultation bilaterally no wheezing, rales, or rhonchi  Gastrointestinal:  abdomen soft, non-tender, no CVA tenderness  Musculoskeletal: supple neck, no lower extremity edema  Integumentary: warm, dry, no rash  Neurologic: awake, alert, oriented x3, extremities’ motor and sensory functions grossly intact

## 2024-07-07 NOTE — H&P ADULT - ASSESSMENT
Impression:    #Hypovolemic hyponatremia  #HAGMA   #Gastroenteritis?      CNS: Avoid CNS Depressants. Monitor for seizures.     HEENT: Oral care. Aspiration precautions     PULMONARY: HOB @ 45. Monitor Pulse Ox. Keep > 93%. Supplement as needed.    CARDIOVASCULAR: LR at 75/hour while NPO. Keep euvolemic,     GI: f/u GI PCR and stool cx. regular diet. zofran prn.     RENAL: Avoid nephrotoxic agents. c/w hydroxychlorquine.     INFECTIOUS DISEASE: no SIRS on admission, monitor off abx.     HEMATOLOGICAL:  Sequentials, cbc Q3 hours, f/u repeat coags. transfuse for Hb<7 since normotensive and only mild tachycardia and no cardiac history. Note: patient has positive antibody screen and lab can not accurately determine a suitable cross matched blood, three type and screen samples were sent so that samples can be sent to another lab in AM, in the meantime if patient needs blood, the physician should sign a form in the blood bank to release the blood product. f/u IR, surgery, and ob/gyn for reassessment for embolization/surgical intervention should the Hb not stabilize.     Gynecological: f/u repeat hcg, TVUS, OB/gyn and general surgery consult.     ENDOCRINE:  f/u repeat hcg, c/w synthroid.     MUSCULOSKELETAL: Ambulate as tolerated     CODE STATUS: Full code    DISPOSITION: ICU     Impression:    #Hypovolemic hyponatremia  #HAGMA possible starvation ketoacidosis   #Gastroenteritis?      CNS: Avoid CNS Depressants. Monitor for seizures.     HEENT: Oral care. Aspiration precautions     PULMONARY: HOB @ 45. Monitor Pulse Ox. Keep > 93%. Supplement as needed.    CARDIOVASCULAR: Keep euvolemic. patient bloused 2L in ED.     GI: f/u GI PCR and stool cx. regular diet. zofran prn.     RENAL: correct Na by 8-10 meq per 24 hours. Monitor electrolytes and ABG. Patient with HAGMA and no JEN, normal lactate, no toxin ingestion, normal blood sugar not on SGLT2 inhibitor. Possible starvation ketoacidosis. Avoid nephrotoxic agents. f/u urine osm, Na, urine ketones, serum osm, BHB, TSH, cortisol, lipid profile (r/o lipemic serum).     INFECTIOUS DISEASE: no SIRS on admission, monitor off abx.     HEMATOLOGICAL:  DVT proph.     Gynecology: f/u HCG    ENDOCRINE: f/u TSH and cortisol.     MUSCULOSKELETAL: Ambulate as tolerated     Psychiatric: Depressed affect, no suicidal ideation, consider psych consult in AM.      CODE STATUS: Full code    DISPOSITION: ICU     Impression:    #Hypovolemic hyponatremia  #HAGMA possible starvation ketoacidosis   #Gastroenteritis?      CNS: Avoid CNS Depressants. Monitor for seizures.     HEENT: Oral care. Aspiration precautions     PULMONARY: HOB @ 45. Monitor Pulse Ox. Keep > 93%. Supplement as needed.    CARDIOVASCULAR: Keep euvolemic. patient bloused 2L in ED.     GI: f/u GI PCR and stool cx. regular diet. zofran prn.     RENAL: Patient denies using diuretics/SSRI or any other medications or elicit drugs. correct Na by 8-10 meq per 24 hours. Monitor electrolytes and ABG. Patient with HAGMA and no JEN, normal lactate, no toxin ingestion, normal blood sugar not on SGLT2 inhibitor. Possible starvation ketoacidosis. Avoid nephrotoxic agents. f/u urine osm, Na, urine ketones, serum osm, BHB, TSH, cortisol, lipid profile (r/o lipemic serum).     INFECTIOUS DISEASE: no SIRS on admission, monitor off abx.     HEMATOLOGICAL:  DVT proph.     Gynecology: f/u HCG    ENDOCRINE: f/u TSH and cortisol.     MUSCULOSKELETAL: Ambulate as tolerated     Psychiatric: Depressed affect, no suicidal ideation, consider psych consult in AM.      CODE STATUS: Full code    DISPOSITION: ICU     Impression:    #Hypovolemic hyponatremia  #HAGMA possible starvation ketoacidosis   #Gastroenteritis?      CNS: Avoid CNS Depressants. Monitor for seizures.     HEENT: Oral care. Aspiration precautions     PULMONARY: HOB @ 45. Monitor Pulse Ox. Keep > 93%. Supplement as needed.    CARDIOVASCULAR: Keep euvolemic. patient bloused 2L in ED.     GI: f/u GI PCR and stool cx. regular diet. zofran prn.     RENAL: Patient denies using diuretics/SSRI or any other medications or elicit drugs. correct Na by 8-10 meq per 24 hours. Monitor electrolytes and ABG. Patient with HAGMA and no JEN, normal lactate, no toxin ingestion, no alcohol ingestion, normal blood sugar not on SGLT2 inhibitor. Possible starvation ketoacidosis. Avoid nephrotoxic agents. f/u urine osm, Na, urine ketones, serum osm, BHB, TSH, cortisol, lipid profile (r/o lipemic serum).     INFECTIOUS DISEASE: no SIRS on admission, monitor off abx.     HEMATOLOGICAL:  DVT proph.     Gynecology: f/u HCG    ENDOCRINE: f/u TSH and cortisol.     MUSCULOSKELETAL: Ambulate as tolerated     Psychiatric: Depressed affect, no suicidal ideation, consider psych consult in AM.      CODE STATUS: Full code    DISPOSITION: ICU

## 2024-07-07 NOTE — PATIENT PROFILE ADULT - FALL HARM RISK - HARM RISK INTERVENTIONS

## 2024-07-08 LAB
ALBUMIN SERPL ELPH-MCNC: 4.2 G/DL — SIGNIFICANT CHANGE UP (ref 3.5–5.2)
ALBUMIN SERPL ELPH-MCNC: 4.6 G/DL — SIGNIFICANT CHANGE UP (ref 3.5–5.2)
ALP SERPL-CCNC: 62 U/L — SIGNIFICANT CHANGE UP (ref 30–115)
ALP SERPL-CCNC: 71 U/L — SIGNIFICANT CHANGE UP (ref 30–115)
ALT FLD-CCNC: 50 U/L — HIGH (ref 0–41)
ALT FLD-CCNC: 54 U/L — HIGH (ref 0–41)
ANION GAP SERPL CALC-SCNC: 11 MMOL/L — SIGNIFICANT CHANGE UP (ref 7–14)
ANION GAP SERPL CALC-SCNC: 13 MMOL/L — SIGNIFICANT CHANGE UP (ref 7–14)
ANION GAP SERPL CALC-SCNC: 15 MMOL/L — HIGH (ref 7–14)
AST SERPL-CCNC: 47 U/L — HIGH (ref 0–41)
AST SERPL-CCNC: 49 U/L — HIGH (ref 0–41)
B-OH-BUTYR SERPL-SCNC: 0.5 MMOL/L — HIGH
BASOPHILS # BLD AUTO: 0.03 K/UL — SIGNIFICANT CHANGE UP (ref 0–0.2)
BASOPHILS NFR BLD AUTO: 0.6 % — SIGNIFICANT CHANGE UP (ref 0–1)
BILIRUB SERPL-MCNC: 0.4 MG/DL — SIGNIFICANT CHANGE UP (ref 0.2–1.2)
BILIRUB SERPL-MCNC: 0.7 MG/DL — SIGNIFICANT CHANGE UP (ref 0.2–1.2)
BUN SERPL-MCNC: 12 MG/DL — SIGNIFICANT CHANGE UP (ref 10–20)
BUN SERPL-MCNC: 15 MG/DL — SIGNIFICANT CHANGE UP (ref 10–20)
BUN SERPL-MCNC: 16 MG/DL — SIGNIFICANT CHANGE UP (ref 10–20)
CALCIUM SERPL-MCNC: 9 MG/DL — SIGNIFICANT CHANGE UP (ref 8.4–10.5)
CALCIUM SERPL-MCNC: 9 MG/DL — SIGNIFICANT CHANGE UP (ref 8.4–10.5)
CALCIUM SERPL-MCNC: 9.3 MG/DL — SIGNIFICANT CHANGE UP (ref 8.4–10.5)
CHLORIDE SERPL-SCNC: 91 MMOL/L — LOW (ref 98–110)
CHLORIDE SERPL-SCNC: 95 MMOL/L — LOW (ref 98–110)
CHLORIDE SERPL-SCNC: 95 MMOL/L — LOW (ref 98–110)
CO2 SERPL-SCNC: 16 MMOL/L — LOW (ref 17–32)
CO2 SERPL-SCNC: 18 MMOL/L — SIGNIFICANT CHANGE UP (ref 17–32)
CO2 SERPL-SCNC: 19 MMOL/L — SIGNIFICANT CHANGE UP (ref 17–32)
CORTIS AM PEAK SERPL-MCNC: 2.3 UG/DL — LOW (ref 6–18.4)
CREAT SERPL-MCNC: 0.5 MG/DL — LOW (ref 0.7–1.5)
CREAT SERPL-MCNC: 0.6 MG/DL — LOW (ref 0.7–1.5)
CREAT SERPL-MCNC: <0.5 MG/DL — LOW (ref 0.7–1.5)
EGFR: 130 ML/MIN/1.73M2 — SIGNIFICANT CHANGE UP
EGFR: 136 ML/MIN/1.73M2 — SIGNIFICANT CHANGE UP
EGFR: 143 ML/MIN/1.73M2 — SIGNIFICANT CHANGE UP
EOSINOPHIL # BLD AUTO: 0.06 K/UL — SIGNIFICANT CHANGE UP (ref 0–0.7)
EOSINOPHIL NFR BLD AUTO: 1.2 % — SIGNIFICANT CHANGE UP (ref 0–8)
GLUCOSE SERPL-MCNC: 103 MG/DL — HIGH (ref 70–99)
GLUCOSE SERPL-MCNC: 143 MG/DL — HIGH (ref 70–99)
GLUCOSE SERPL-MCNC: 87 MG/DL — SIGNIFICANT CHANGE UP (ref 70–99)
HCT VFR BLD CALC: 38.3 % — SIGNIFICANT CHANGE UP (ref 37–47)
HGB BLD-MCNC: 13.7 G/DL — SIGNIFICANT CHANGE UP (ref 12–16)
IMM GRANULOCYTES NFR BLD AUTO: 0.2 % — SIGNIFICANT CHANGE UP (ref 0.1–0.3)
LYMPHOCYTES # BLD AUTO: 2.54 K/UL — SIGNIFICANT CHANGE UP (ref 1.2–3.4)
LYMPHOCYTES # BLD AUTO: 52.2 % — HIGH (ref 20.5–51.1)
MAGNESIUM SERPL-MCNC: 1.7 MG/DL — LOW (ref 1.8–2.4)
MCHC RBC-ENTMCNC: 30 PG — SIGNIFICANT CHANGE UP (ref 27–31)
MCHC RBC-ENTMCNC: 35.8 G/DL — SIGNIFICANT CHANGE UP (ref 32–37)
MCV RBC AUTO: 83.8 FL — SIGNIFICANT CHANGE UP (ref 81–99)
MONOCYTES # BLD AUTO: 0.57 K/UL — SIGNIFICANT CHANGE UP (ref 0.1–0.6)
MONOCYTES NFR BLD AUTO: 11.7 % — HIGH (ref 1.7–9.3)
NEUTROPHILS # BLD AUTO: 1.66 K/UL — SIGNIFICANT CHANGE UP (ref 1.4–6.5)
NEUTROPHILS NFR BLD AUTO: 34.1 % — LOW (ref 42.2–75.2)
NRBC # BLD: 0 /100 WBCS — SIGNIFICANT CHANGE UP (ref 0–0)
PCP SPEC-MCNC: SIGNIFICANT CHANGE UP
PHOSPHATE SERPL-MCNC: 3.9 MG/DL — SIGNIFICANT CHANGE UP (ref 2.1–4.9)
PLATELET # BLD AUTO: 200 K/UL — SIGNIFICANT CHANGE UP (ref 130–400)
PMV BLD: 10.5 FL — HIGH (ref 7.4–10.4)
POTASSIUM SERPL-MCNC: 4.2 MMOL/L — SIGNIFICANT CHANGE UP (ref 3.5–5)
POTASSIUM SERPL-MCNC: 4.7 MMOL/L — SIGNIFICANT CHANGE UP (ref 3.5–5)
POTASSIUM SERPL-MCNC: 4.8 MMOL/L — SIGNIFICANT CHANGE UP (ref 3.5–5)
POTASSIUM SERPL-SCNC: 4.2 MMOL/L — SIGNIFICANT CHANGE UP (ref 3.5–5)
POTASSIUM SERPL-SCNC: 4.7 MMOL/L — SIGNIFICANT CHANGE UP (ref 3.5–5)
POTASSIUM SERPL-SCNC: 4.8 MMOL/L — SIGNIFICANT CHANGE UP (ref 3.5–5)
PROT SERPL-MCNC: 6.9 G/DL — SIGNIFICANT CHANGE UP (ref 6–8)
PROT SERPL-MCNC: 7.3 G/DL — SIGNIFICANT CHANGE UP (ref 6–8)
RBC # BLD: 4.57 M/UL — SIGNIFICANT CHANGE UP (ref 4.2–5.4)
RBC # FLD: 12.4 % — SIGNIFICANT CHANGE UP (ref 11.5–14.5)
SODIUM SERPL-SCNC: 122 MMOL/L — LOW (ref 135–146)
SODIUM SERPL-SCNC: 125 MMOL/L — LOW (ref 135–146)
SODIUM SERPL-SCNC: 126 MMOL/L — LOW (ref 135–146)
TSH SERPL-MCNC: 2.13 UIU/ML — SIGNIFICANT CHANGE UP (ref 0.27–4.2)
TSH SERPL-MCNC: 2.35 UIU/ML — SIGNIFICANT CHANGE UP (ref 0.27–4.2)
WBC # BLD: 4.87 K/UL — SIGNIFICANT CHANGE UP (ref 4.8–10.8)
WBC # FLD AUTO: 4.87 K/UL — SIGNIFICANT CHANGE UP (ref 4.8–10.8)

## 2024-07-08 PROCEDURE — 93312 ECHO TRANSESOPHAGEAL: CPT | Mod: 26

## 2024-07-08 PROCEDURE — 71045 X-RAY EXAM CHEST 1 VIEW: CPT | Mod: 26

## 2024-07-08 PROCEDURE — 99233 SBSQ HOSP IP/OBS HIGH 50: CPT

## 2024-07-08 PROCEDURE — 93320 DOPPLER ECHO COMPLETE: CPT | Mod: 26

## 2024-07-08 PROCEDURE — 93325 DOPPLER ECHO COLOR FLOW MAPG: CPT | Mod: 26

## 2024-07-08 RX ORDER — HYDROCORTISONE 100 MG/60ML
15 SUSPENSION RECTAL ONCE
Refills: 0 | Status: DISCONTINUED | OUTPATIENT
Start: 2024-07-08 | End: 2024-07-08

## 2024-07-08 RX ORDER — HYDROCORTISONE 100 MG/60ML
15 SUSPENSION RECTAL DAILY
Refills: 0 | Status: DISCONTINUED | OUTPATIENT
Start: 2024-07-09 | End: 2024-07-10

## 2024-07-08 RX ORDER — DEXTROSE MONOHYDRATE AND SODIUM CHLORIDE 5; .3 G/100ML; G/100ML
1000 INJECTION, SOLUTION INTRAVENOUS
Refills: 0 | Status: DISCONTINUED | OUTPATIENT
Start: 2024-07-08 | End: 2024-07-08

## 2024-07-08 RX ORDER — MAGNESIUM SULFATE 100 %
2 POWDER (GRAM) MISCELLANEOUS ONCE
Refills: 0 | Status: COMPLETED | OUTPATIENT
Start: 2024-07-08 | End: 2024-07-08

## 2024-07-08 RX ORDER — HYDROCORTISONE 100 MG/60ML
15 SUSPENSION RECTAL ONCE
Refills: 0 | Status: COMPLETED | OUTPATIENT
Start: 2024-07-08 | End: 2024-07-08

## 2024-07-08 RX ORDER — HYDROCORTISONE 100 MG/60ML
5 SUSPENSION RECTAL DAILY
Refills: 0 | Status: DISCONTINUED | OUTPATIENT
Start: 2024-07-09 | End: 2024-07-10

## 2024-07-08 RX ADMIN — DEXTROSE MONOHYDRATE AND SODIUM CHLORIDE 100 MILLILITER(S): 5; .3 INJECTION, SOLUTION INTRAVENOUS at 07:49

## 2024-07-08 RX ADMIN — SODIUM BICARBONATE 650 MILLIGRAM(S): 650 TABLET ORAL at 21:02

## 2024-07-08 RX ADMIN — Medication 1 APPLICATION(S): at 05:56

## 2024-07-08 RX ADMIN — ENOXAPARIN SODIUM 40 MILLIGRAM(S): 100 INJECTION SUBCUTANEOUS at 05:56

## 2024-07-08 RX ADMIN — HYDROCORTISONE 15 MILLIGRAM(S): 100 SUSPENSION RECTAL at 18:37

## 2024-07-08 RX ADMIN — SODIUM BICARBONATE 650 MILLIGRAM(S): 650 TABLET ORAL at 13:53

## 2024-07-08 RX ADMIN — SODIUM BICARBONATE 650 MILLIGRAM(S): 650 TABLET ORAL at 05:56

## 2024-07-08 RX ADMIN — Medication 25 GRAM(S): at 07:49

## 2024-07-08 RX ADMIN — DEXTROSE MONOHYDRATE AND SODIUM CHLORIDE 100 MILLILITER(S): 5; .3 INJECTION, SOLUTION INTRAVENOUS at 02:23

## 2024-07-08 NOTE — PROGRESS NOTE ADULT - SUBJECTIVE AND OBJECTIVE BOX
Nephrology progress note    THIS IS AN INCOMPLETE NOTE . FULL NOTE TO FOLLOW SHORTLY    Patient is seen and examined, events over the last 24 h noted .    Allergies:  No Known Allergies    Hospital Medications:   MEDICATIONS  (STANDING):  chlorhexidine 2% Cloths 1 Application(s) Topical <User Schedule>  enoxaparin Injectable 40 milliGRAM(s) SubCutaneous every 24 hours  sodium bicarbonate 650 milliGRAM(s) Oral every 8 hours        VITALS:  T(F): 96.6 (07-08-24 @ 08:00), Max: 98.1 (07-07-24 @ 12:00)  HR: 107 (07-08-24 @ 08:00)  BP: 102/57 (07-08-24 @ 08:00)  RR: 27 (07-08-24 @ 08:00)  SpO2: 100% (07-08-24 @ 08:00)  Wt(kg): --    07-06 @ 07:01  -  07-07 @ 07:00  --------------------------------------------------------  IN: 150 mL / OUT: 850 mL / NET: -700 mL    07-07 @ 07:01  -  07-08 @ 07:00  --------------------------------------------------------  IN: 2590 mL / OUT: 1100 mL / NET: 1490 mL    07-08 @ 07:01  -  07-08 @ 08:35  --------------------------------------------------------  IN: 100 mL / OUT: 250 mL / NET: -150 mL          PHYSICAL EXAM:  Constitutional: NAD  HEENT: anicteric sclera, oropharynx clear, MMM  Neck: No JVD  Respiratory: CTAB, no wheezes, rales or rhonchi  Cardiovascular: S1, S2, RRR  Gastrointestinal: BS+, soft, NT/ND  Extremities: No cyanosis or clubbing. No peripheral edema  :  No steven.   Skin: No rashes    LABS:  07-08    126<L>  |  95<L>  |  15  ----------------------------<  103<H>  4.8   |  16<L>  |  <0.5<L>    SODIUM TREND:  Sodium 126 [07-08 @ 04:54]  Sodium 125 [07-08 @ 00:06]  Sodium 121 [07-07 @ 21:00]  Sodium 121 [07-07 @ 16:56]  Sodium 120 [07-07 @ 11:10]  Sodium 115 [07-07 @ 03:53]  Sodium 110 [07-07 @ 00:06]    Ca    9.0      08 Jul 2024 04:54  Phos  3.9     07-08  Mg     1.7     07-08    TPro  6.9  /  Alb  4.2  /  TBili  0.4  /  DBili      /  AST  47<H>  /  ALT  50<H>  /  AlkPhos  71  07-08                          13.7   4.87  )-----------( 200      ( 08 Jul 2024 04:54 )             38.3       Urine Studies:  Urinalysis Basic - ( 08 Jul 2024 04:54 )    Color:  / Appearance:  / SG:  / pH:   Gluc: 103 mg/dL / Ketone:   / Bili:  / Urobili:    Blood:  / Protein:  / Nitrite:    Leuk Esterase:  / RBC:  / WBC    Sq Epi:  / Non Sq Epi:  / Bacteria:       Sodium, Random Urine: 93.0 mmoL/L (07-07 @ 11:31)  Sodium, Random Urine: 39.0 mmoL/L (07-07 @ 05:40)  Creatinine, Random Urine: 9 mg/dL (07-07 @ 05:40)  Protein/Creatinine Ratio Calculation: <0.6 Ratio (07-07 @ 05:40)  Osmolality, Random Urine: 154 mos/kg (07-07 @ 05:40)  Potassium, Random Urine: 9 mmol/L (07-07 @ 05:40)      Lipid: chol 73, TG 49, HDL 41, LDL --      [07-07-24 @ 04:40]          RADIOLOGY & ADDITIONAL STUDIES:   Nephrology progress note    Patient is seen and examined, events over the last 24 h noted .  Lying in bed comfortable     Allergies:  No Known Allergies    Hospital Medications:   MEDICATIONS  (STANDING):  chlorhexidine 2% Cloths 1 Application(s) Topical <User Schedule>  enoxaparin Injectable 40 milliGRAM(s) SubCutaneous every 24 hours  sodium bicarbonate 650 milliGRAM(s) Oral every 8 hours        VITALS:  T(F): 96.6 (07-08-24 @ 08:00), Max: 98.1 (07-07-24 @ 12:00)  HR: 107 (07-08-24 @ 08:00)  BP: 102/57 (07-08-24 @ 08:00)  RR: 27 (07-08-24 @ 08:00)  SpO2: 100% (07-08-24 @ 08:00)  Wt(kg): --    07-06 @ 07:01  -  07-07 @ 07:00  --------------------------------------------------------  IN: 150 mL / OUT: 850 mL / NET: -700 mL    07-07 @ 07:01  -  07-08 @ 07:00  --------------------------------------------------------  IN: 2590 mL / OUT: 1100 mL / NET: 1490 mL    07-08 @ 07:01  -  07-08 @ 08:35  --------------------------------------------------------  IN: 100 mL / OUT: 250 mL / NET: -150 mL          PHYSICAL EXAM:  Constitutional: NAD  Respiratory: CTAB, no wheezes, rales or rhonchi  Cardiovascular: S1, S2, RRR  Gastrointestinal: BS+, soft, NT/ND  Extremities: No cyanosis or clubbing. No peripheral edema  :  No steven.   Skin: No rashes    LABS:  07-08    126<L>  |  95<L>  |  15  ----------------------------<  103<H>  4.8   |  16<L>  |  <0.5<L>    SODIUM TREND:  Sodium 126 [07-08 @ 04:54]  Sodium 125 [07-08 @ 00:06]  Sodium 121 [07-07 @ 21:00]  Sodium 121 [07-07 @ 16:56]  Sodium 120 [07-07 @ 11:10]  Sodium 115 [07-07 @ 03:53]  Sodium 110 [07-07 @ 00:06]    Ca    9.0      08 Jul 2024 04:54  Phos  3.9     07-08  Mg     1.7     07-08    TPro  6.9  /  Alb  4.2  /  TBili  0.4  /  DBili      /  AST  47<H>  /  ALT  50<H>  /  AlkPhos  71  07-08                          13.7   4.87  )-----------( 200      ( 08 Jul 2024 04:54 )             38.3       Urine Studies:  Urinalysis Basic - ( 08 Jul 2024 04:54 )    Color:  / Appearance:  / SG:  / pH:   Gluc: 103 mg/dL / Ketone:   / Bili:  / Urobili:    Blood:  / Protein:  / Nitrite:    Leuk Esterase:  / RBC:  / WBC    Sq Epi:  / Non Sq Epi:  / Bacteria:       Sodium, Random Urine: 93.0 mmoL/L (07-07 @ 11:31)  Sodium, Random Urine: 39.0 mmoL/L (07-07 @ 05:40)  Creatinine, Random Urine: 9 mg/dL (07-07 @ 05:40)  Protein/Creatinine Ratio Calculation: <0.6 Ratio (07-07 @ 05:40)  Osmolality, Random Urine: 154 mos/kg (07-07 @ 05:40)  Potassium, Random Urine: 9 mmol/L (07-07 @ 05:40)      Lipid: chol 73, TG 49, HDL 41, LDL --      [07-07-24 @ 04:40]          RADIOLOGY & ADDITIONAL STUDIES:

## 2024-07-08 NOTE — CHART NOTE - NSCHARTNOTEFT_GEN_A_CORE
Transfer Note    Transfer from:     Transfer to: (  ) Medicine    (  ) Telemetry    (  ) RCU                               (  ) Palliative    (  ) Stroke Unit    (  ) MICU    (  ) STEPDOWN      HOSPITAL COURSE:  HPI:  23-year-old female with no significant past medical history presents complaining of decreased oral intake for three days, yesterday her father passed away and since then she developed nausea and vomiting with mild epigastric abdominal pain. She endorses 3 episodes of NBNB emesis as well as 1 episode of watery nonbloody diarrhea but then her symptoms improved slightly and she now denies diarrhea and says the abdominal pain improved but she has significant generalized weakness.  Reports she returned from [place] on June 17 but was not feeling sick after she returned. Denies fever/chills, chest pain, shortness of breath, dysuria/frequency/urgency/hematuria, vaginal bleeding/discharge, lightheadedness, dizziness, LOC, or abnormal limb movements.     Patient was hypotensive and tachycardic on presentation (according to sign out but recorded vitals do not show hypotension), on RA.  Labs significant for Na= 110, k=5.1, AG=15, AST=52, ALT=48, vbg Ph 7.21 improved to 7.33.     Patient admitted for further work up and management of hyponatremia and HAGMA.     In the ICU, started on NS @ 50 to correct Na by 8-10 meq/24 hours and zofran prn for nausea. Repeat CMP at 07/07 at 8 pm, Na 121, switched to D5 + NaHco3 50 meq @ 50. Repeat CMP showed Na 125 7/07 at 10 30 pm, nephro consulted, d/c D5 + Na bicarb and started on D5 only @ 100, Nephro consulted, c/w D5 @ 100 (avoid overcorrection), target Na 122 by the end of day, c/w po bicarb, restrict fluid to 1.5L. Blood cultures, UA, TSH, cortisol pending. ECHO showed ******. Patient has had no episodes of vomiting since admission, nausea resolved, resumed eating. Last bowel movement was last night. BMP (07/08) shows ******. Patient medically stable to be DGTF.        (07 Jul 2024 03:46)        Vital Signs Last 24 Hrs  T(C): 35.9 (08 Jul 2024 08:00), Max: 36.7 (07 Jul 2024 12:00)  T(F): 96.6 (08 Jul 2024 08:00), Max: 98.1 (07 Jul 2024 12:00)  HR: 107 (08 Jul 2024 08:00) (93 - 134)  BP: 102/57 (08 Jul 2024 08:00) (83/53 - 109/78)  BP(mean): 72 (08 Jul 2024 08:00) (61 - 89)  RR: 27 (08 Jul 2024 08:00) (17 - 75)  SpO2: 100% (08 Jul 2024 08:00) (97% - 100%)    Parameters below as of 08 Jul 2024 08:00  Patient On (Oxygen Delivery Method): room air        I&O's Summary    07 Jul 2024 07:01  -  08 Jul 2024 07:00  --------------------------------------------------------  IN: 2590 mL / OUT: 1100 mL / NET: 1490 mL    08 Jul 2024 07:01  -  08 Jul 2024 10:08  --------------------------------------------------------  IN: 100 mL / OUT: 250 mL / NET: -150 mL        Physical Exam    PHYSICAL EXAM:  GEN: NAD, Resting comfortably in bed, cooperative  PULM: Clear to auscultation bilaterally, No wheezing, rales, rhonchi  CVS: Regular rate and rhythm, S1-S2, no murmurs, heaves, thrills  ABD: Soft, non-tender, non-distended, no guarding, BS +  EXT: No edema/cyanosis, extremities warm/dry, peripheral pulses palpable   NEURO: A&Ox3, no focal deficits        LABS:                               13.7   4.87  )-----------( 200      ( 08 Jul 2024 04:54 )             38.3       07-08    126<L>  |  95<L>  |  15  ----------------------------<  103<H>  4.8   |  16<L>  |  <0.5<L>    Ca    9.0      08 Jul 2024 04:54  Phos  3.9     07-08  Mg     1.7     07-08    TPro  6.9  /  Alb  4.2  /  TBili  0.4  /  DBili  x   /  AST  47<H>  /  ALT  50<H>  /  AlkPhos  71  07-08      PT/INR - ( 07 Jul 2024 11:10 )   PT: 14.30 sec;   INR: 1.25 ratio         PTT - ( 07 Jul 2024 11:10 )  PTT:37.4 sec    ABG - ( 07 Jul 2024 03:58 )  pH, Arterial: 7.33  pH, Blood: x     /  pCO2: 31    /  pO2: 38    / HCO3: 16    / Base Excess: -8.4  /  SaO2: 66.3                  ECG: (07/07) Diagnosis Line Sinus tachycardia. Possible Left atrial enlargement      Telemetry:    Imaging:    Echo: (07/08) *******      ASSESSMENT & PLAN:     # Nausea/vomiting   # Hypovolemic hyponatremia  # HAGMA possible starvation ketoacidosis   # Gastroenteritis?  # Mixed AG + NAG metabolic acidosis   - initial Na 110  - s/p 2L fluid bolus in the ED  - started on NS @ 50, to correct ~8-10 meq/24 hrs   - Hcg neg  - repeat CMP at 07/07 at 8 pm, Na 121, started on D5 + NaHco3 50 meq @ 50  - Na 125 7/07 at 10 30 pm, nephro consulted, d/c D5 + Na bicarb and started on D5 only @ 100  - Nephro consulted, c/w D5 @ 100 (avoid overcorrection), target Na 122 by the end of day, c/w po bicarb, restrict fluid to 1.5L  - if Na 120- 125, the DGTF   - f/u BMP, lytes   - f/u GI PCR and stool culture  - f/u TSH, cortisol, UA ( check urine gap)   - f/u echo     # Depressed affect   - father passed away at [facility] due to MI one day prior to presentation   - no suicidal ideation   - possible psych consult Transfer Note    Transfer from:     Transfer to: ( X ) Medicine    (  ) Telemetry    (  ) RCU                               (  ) Palliative    (  ) Stroke Unit    (  ) MICU    (  ) STEPDOWN      Approved by Dr. Adam GIRALDO [facility] COURSE:  HPI:  23-year-old female with no significant past medical history presents complaining of decreased oral intake for three days, yesterday her father passed away and since then she developed nausea and vomiting with mild epigastric abdominal pain. She endorses 3 episodes of NBNB emesis as well as 1 episode of watery nonbloody diarrhea but then her symptoms improved slightly and she now denies diarrhea and says the abdominal pain improved but she has significant generalized weakness.  Reports she returned from [place] on June 17 but was not feeling sick after she returned. Denies fever/chills, chest pain, shortness of breath, dysuria/frequency/urgency/hematuria, vaginal bleeding/discharge, lightheadedness, dizziness, LOC, or abnormal limb movements.     Patient was hypotensive and tachycardic on presentation (according to sign out but recorded vitals do not show hypotension), on RA.  Labs significant for Na= 110, k=5.1, AG=15, AST=52, ALT=48, vbg Ph 7.21 improved to 7.33.     Patient admitted for further work up and management of hyponatremia and HAGMA.     In the ICU, started on NS @ 50 to correct Na by 8-10 meq/24 hours and zofran prn for nausea. Repeat CMP at 07/07 at 8 pm, Na 121, switched to D5 + NaHco3 50 meq @ 50. Repeat CMP showed Na 125 7/07 at 10 30 pm, nephro consulted, d/c D5 + Na bicarb and started on D5 only @ 100, Nephro consulted, c/w D5 @ 100 (avoid overcorrection), target Na 122 by the end of day, c/w po bicarb, restrict fluid to 1.5L. Blood cultures, UA, TSH, cortisol pending. ECHO showed ******. Patient has had no episodes of vomiting since admission, nausea resolved, resumed eating. Last bowel movement was last night. BMP (07/08) shows ******. Patient medically stable to be DGTF.        (07 Jul 2024 03:46)        Vital Signs Last 24 Hrs  T(C): 35.9 (08 Jul 2024 08:00), Max: 36.7 (07 Jul 2024 12:00)  T(F): 96.6 (08 Jul 2024 08:00), Max: 98.1 (07 Jul 2024 12:00)  HR: 107 (08 Jul 2024 08:00) (93 - 134)  BP: 102/57 (08 Jul 2024 08:00) (83/53 - 109/78)  BP(mean): 72 (08 Jul 2024 08:00) (61 - 89)  RR: 27 (08 Jul 2024 08:00) (17 - 75)  SpO2: 100% (08 Jul 2024 08:00) (97% - 100%)    Parameters below as of 08 Jul 2024 08:00  Patient On (Oxygen Delivery Method): room air        I&O's Summary    07 Jul 2024 07:01  -  08 Jul 2024 07:00  --------------------------------------------------------  IN: 2590 mL / OUT: 1100 mL / NET: 1490 mL    08 Jul 2024 07:01  -  08 Jul 2024 10:08  --------------------------------------------------------  IN: 100 mL / OUT: 250 mL / NET: -150 mL        Physical Exam    PHYSICAL EXAM:  GEN: NAD, Resting comfortably in bed, cooperative  PULM: Clear to auscultation bilaterally, No wheezing, rales, rhonchi  CVS: Regular rate and rhythm, S1-S2, no murmurs, heaves, thrills  ABD: Soft, non-tender, non-distended, no guarding, BS +  EXT: No edema/cyanosis, extremities warm/dry, peripheral pulses palpable   NEURO: A&Ox3, no focal deficits        LABS:                               13.7   4.87  )-----------( 200      ( 08 Jul 2024 04:54 )             38.3       07-08    126<L>  |  95<L>  |  15  ----------------------------<  103<H>  4.8   |  16<L>  |  <0.5<L>    Ca    9.0      08 Jul 2024 04:54  Phos  3.9     07-08  Mg     1.7     07-08    TPro  6.9  /  Alb  4.2  /  TBili  0.4  /  DBili  x   /  AST  47<H>  /  ALT  50<H>  /  AlkPhos  71  07-08      PT/INR - ( 07 Jul 2024 11:10 )   PT: 14.30 sec;   INR: 1.25 ratio         PTT - ( 07 Jul 2024 11:10 )  PTT:37.4 sec    ABG - ( 07 Jul 2024 03:58 )  pH, Arterial: 7.33  pH, Blood: x     /  pCO2: 31    /  pO2: 38    / HCO3: 16    / Base Excess: -8.4  /  SaO2: 66.3                  ECG: (07/07) Diagnosis Line Sinus tachycardia. Possible Left atrial enlargement      Telemetry:    Imaging:    Echo: (07/08) *******      ASSESSMENT & PLAN:     # Nausea/vomiting   # Hypovolemic hyponatremia  # HAGMA possible starvation ketoacidosis   # Gastroenteritis?  # Mixed AG + NAG metabolic acidosis   - initial Na 110  - s/p 2L fluid bolus in the ED  - started on NS @ 50, to correct ~8-10 meq/24 hrs   - Hcg neg  - repeat CMP at 07/07 at 8 pm, Na 121, started on D5 + NaHco3 50 meq @ 50  - Na 125 7/07 at 10 30 pm, nephro consulted, d/c D5 + Na bicarb and started on D5 only @ 100  - Nephro consulted, c/w D5 @ 100 (avoid overcorrection), target Na 122 by the end of day, c/w po bicarb, restrict fluid to 1.5L  - if Na 120- 125, the DGTF   - f/u BMP, lytes   - f/u GI PCR and stool culture  - f/u TSH, cortisol, UA ( check urine gap)   - f/u echo     # Depressed affect   - father passed away at [facility] due to MI one day prior to presentation   - no suicidal ideation   - possible psych consult Transfer Note    Transfer from: MICU    Transfer to: ( X ) Medicine    (  ) Telemetry    (  ) RCU                               (  ) Palliative    (  ) Stroke Unit    (  ) MICU    (  ) STEPDOWN      Approved by Dr. Adam GIRALDO [facility] COURSE:  HPI:  23-year-old female with no significant past medical history presents complaining of decreased oral intake for three days, yesterday her father passed away and since then she developed nausea and vomiting with mild epigastric abdominal pain. She endorses 3 episodes of NBNB emesis as well as 1 episode of watery nonbloody diarrhea but then her symptoms improved slightly and she now denies diarrhea and says the abdominal pain improved but she has significant generalized weakness.  Reports she returned from [place] on June 17 but was not feeling sick after she returned. Denies fever/chills, chest pain, shortness of breath, dysuria/frequency/urgency/hematuria, vaginal bleeding/discharge, lightheadedness, dizziness, LOC, or abnormal limb movements.     Patient was hypotensive and tachycardic on presentation (according to sign out but recorded vitals do not show hypotension), on RA.  Labs significant for Na= 110, k=5.1, AG=15, AST=52, ALT=48, vbg Ph 7.21 improved to 7.33.     Patient admitted for further work up and management of hyponatremia and HAGMA.     In the ICU, started on NS @ 50 to correct Na by 8-10 meq/24 hours and zofran prn for nausea. Repeat CMP at 07/07 at 8 pm, Na 121, switched to D5 + NaHco3 50 meq @ 50. Repeat CMP showed Na 125 7/07 at 10 30 pm, nephro consulted, d/c D5 + Na bicarb and started on D5 only @ 100, Nephro consulted, c/w D5 @ 100 (avoid overcorrection), target Na 122 by the end of day, c/w po bicarb, restrict fluid to 1.5L. Blood cultures, UA, TSH, cortisol pending. ECHO showed ******. Patient has had no episodes of vomiting since admission, nausea resolved, resumed eating. Last bowel movement was last night. BMP (07/08) shows ******. Patient medically stable to be DGTF.        (07 Jul 2024 03:46)        Vital Signs Last 24 Hrs  T(C): 35.9 (08 Jul 2024 08:00), Max: 36.7 (07 Jul 2024 12:00)  T(F): 96.6 (08 Jul 2024 08:00), Max: 98.1 (07 Jul 2024 12:00)  HR: 107 (08 Jul 2024 08:00) (93 - 134)  BP: 102/57 (08 Jul 2024 08:00) (83/53 - 109/78)  BP(mean): 72 (08 Jul 2024 08:00) (61 - 89)  RR: 27 (08 Jul 2024 08:00) (17 - 75)  SpO2: 100% (08 Jul 2024 08:00) (97% - 100%)    Parameters below as of 08 Jul 2024 08:00  Patient On (Oxygen Delivery Method): room air        I&O's Summary    07 Jul 2024 07:01  -  08 Jul 2024 07:00  --------------------------------------------------------  IN: 2590 mL / OUT: 1100 mL / NET: 1490 mL    08 Jul 2024 07:01  -  08 Jul 2024 10:08  --------------------------------------------------------  IN: 100 mL / OUT: 250 mL / NET: -150 mL        Physical Exam    PHYSICAL EXAM:  GEN: NAD, Resting comfortably in bed, cooperative  PULM: Clear to auscultation bilaterally, No wheezing, rales, rhonchi  CVS: Regular rate and rhythm, S1-S2, no murmurs, heaves, thrills  ABD: Soft, non-tender, non-distended, no guarding, BS +  EXT: No edema/cyanosis, extremities warm/dry, peripheral pulses palpable   NEURO: A&Ox3, no focal deficits        LABS:                               13.7   4.87  )-----------( 200      ( 08 Jul 2024 04:54 )             38.3       07-08    126<L>  |  95<L>  |  15  ----------------------------<  103<H>  4.8   |  16<L>  |  <0.5<L>    Ca    9.0      08 Jul 2024 04:54  Phos  3.9     07-08  Mg     1.7     07-08    TPro  6.9  /  Alb  4.2  /  TBili  0.4  /  DBili  x   /  AST  47<H>  /  ALT  50<H>  /  AlkPhos  71  07-08      PT/INR - ( 07 Jul 2024 11:10 )   PT: 14.30 sec;   INR: 1.25 ratio         PTT - ( 07 Jul 2024 11:10 )  PTT:37.4 sec    ABG - ( 07 Jul 2024 03:58 )  pH, Arterial: 7.33  pH, Blood: x     /  pCO2: 31    /  pO2: 38    / HCO3: 16    / Base Excess: -8.4  /  SaO2: 66.3                  ECG: (07/07) Diagnosis Line Sinus tachycardia. Possible Left atrial enlargement      Telemetry:    Imaging:    Echo: (07/08) *******      ASSESSMENT & PLAN:     # Nausea/vomiting   # Hypovolemic hyponatremia  # HAGMA possible starvation ketoacidosis   # Gastroenteritis?  # Mixed AG + NAG metabolic acidosis   - initial Na 110  - s/p 2L fluid bolus in the ED  - started on NS @ 50, to correct ~8-10 meq/24 hrs   - Hcg neg  - repeat CMP at 07/07 at 8 pm, Na 121, started on D5 + NaHco3 50 meq @ 50  - Na 125 7/07 at 10 30 pm, nephro consulted, d/c D5 + Na bicarb and started on D5 only @ 100  - Nephro consulted, c/w D5 @ 100 (avoid overcorrection), target Na 122 by the end of day, c/w po bicarb, restrict fluid to 1.5L  - if Na 120- 125, the DGTF   - f/u BMP, lytes   - f/u GI PCR and stool culture  - f/u TSH, cortisol, UA ( check urine gap)   - f/u echo     # Depressed affect   - father passed away at [facility] due to MI one day prior to presentation   - no suicidal ideation   - possible psych consult Transfer Note    Transfer from: MICU    Transfer to: ( X ) Medicine    (  ) Telemetry    (  ) RCU                               (  ) Palliative    (  ) Stroke Unit    (  ) MICU    (  ) STEPDOWN      Approved by Dr. Adam GIRALDO [facility] COURSE:  HPI:  23-year-old female with no significant past medical history presents complaining of decreased oral intake for three days, yesterday her father passed away and since then she developed nausea and vomiting with mild epigastric abdominal pain. She endorses 3 episodes of NBNB emesis as well as 1 episode of watery nonbloody diarrhea but then her symptoms improved slightly and she now denies diarrhea and says the abdominal pain improved but she has significant generalized weakness.  Reports she returned from [place] on June 17 but was not feeling sick after she returned. Denies fever/chills, chest pain, shortness of breath, dysuria/frequency/urgency/hematuria, vaginal bleeding/discharge, lightheadedness, dizziness, LOC, or abnormal limb movements.     Patient was hypotensive and tachycardic on presentation (according to sign out but recorded vitals do not show hypotension), on RA.  Labs significant for Na= 110, k=5.1, AG=15, AST=52, ALT=48, vbg Ph 7.21 improved to 7.33.     Patient admitted for further work up and management of hyponatremia and HAGMA.     In the ICU, started on NS @ 50 to correct Na by 8-10 meq/24 hours and zofran prn for nausea. Repeat CMP at 07/07 at 8 pm, Na 121, switched to D5 + NaHco3 50 meq @ 50. Repeat CMP showed Na 125 7/07 at 10 30 pm, nephro consulted, d/c D5 + Na bicarb and started on D5 only @ 100, Nephro consulted, c/w D5 @ 100 (avoid overcorrection), target Na 122 by the end of day, c/w po bicarb, restrict fluid to 1.5L. Blood cultures, UA, TSH, cortisol pending. ECHO showed ******. Patient has had no episodes of vomiting since admission, nausea resolved, resumed eating. Last bowel movement was last night. BMP (07/08) shows ******. Patient medically stable to be DGTF.        (07 Jul 2024 03:46)        Vital Signs Last 24 Hrs  T(C): 35.9 (08 Jul 2024 08:00), Max: 36.7 (07 Jul 2024 12:00)  T(F): 96.6 (08 Jul 2024 08:00), Max: 98.1 (07 Jul 2024 12:00)  HR: 107 (08 Jul 2024 08:00) (93 - 134)  BP: 102/57 (08 Jul 2024 08:00) (83/53 - 109/78)  BP(mean): 72 (08 Jul 2024 08:00) (61 - 89)  RR: 27 (08 Jul 2024 08:00) (17 - 75)  SpO2: 100% (08 Jul 2024 08:00) (97% - 100%)    Parameters below as of 08 Jul 2024 08:00  Patient On (Oxygen Delivery Method): room air        I&O's Summary    07 Jul 2024 07:01  -  08 Jul 2024 07:00  --------------------------------------------------------  IN: 2590 mL / OUT: 1100 mL / NET: 1490 mL    08 Jul 2024 07:01  -  08 Jul 2024 10:08  --------------------------------------------------------  IN: 100 mL / OUT: 250 mL / NET: -150 mL        Physical Exam    PHYSICAL EXAM:  GEN: NAD, Resting comfortably in bed, cooperative  PULM: Clear to auscultation bilaterally, No wheezing, rales, rhonchi  CVS: Regular rate and rhythm, S1-S2, no murmurs, heaves, thrills  ABD: Soft, non-tender, non-distended, no guarding, BS +  EXT: No edema/cyanosis, extremities warm/dry, peripheral pulses palpable   NEURO: A&Ox3, no focal deficits        LABS:                               13.7   4.87  )-----------( 200      ( 08 Jul 2024 04:54 )             38.3       07-08    126<L>  |  95<L>  |  15  ----------------------------<  103<H>  4.8   |  16<L>  |  <0.5<L>    Ca    9.0      08 Jul 2024 04:54  Phos  3.9     07-08  Mg     1.7     07-08    TPro  6.9  /  Alb  4.2  /  TBili  0.4  /  DBili  x   /  AST  47<H>  /  ALT  50<H>  /  AlkPhos  71  07-08      PT/INR - ( 07 Jul 2024 11:10 )   PT: 14.30 sec;   INR: 1.25 ratio         PTT - ( 07 Jul 2024 11:10 )  PTT:37.4 sec    ABG - ( 07 Jul 2024 03:58 )  pH, Arterial: 7.33  pH, Blood: x     /  pCO2: 31    /  pO2: 38    / HCO3: 16    / Base Excess: -8.4  /  SaO2: 66.3                  ECG: (07/07) Diagnosis Line Sinus tachycardia. Possible Left atrial enlargement      Telemetry:    Imaging:    Echo: (07/08) *******      ASSESSMENT & PLAN:     # Nausea/vomiting   # Hypovolemic hyponatremia  # HAGMA possible starvation ketoacidosis   # Gastroenteritis?  # Mixed AG + NAG metabolic acidosis   - initial Na 110  - s/p 2L fluid bolus in the ED  - started on NS @ 50, to correct ~8-10 meq/24 hrs   - Hcg neg  - repeat CMP at 07/07 at 8 pm, Na 121, started on D5 + NaHco3 50 meq @ 50  - Na 125 7/07 at 10 30 pm, nephro consulted, d/c D5 + Na bicarb and started on D5 only @ 100  - Nephro consulted, c/w D5 @ 100 (avoid overcorrection), target Na 122 by the end of day, c/w po bicarb, restrict fluid to 1.5L  - if Na 120- 125, the DGTF   - cortisol 2.3, endo consulted to r/o [place]'s   - f/u BMP, lytes   - f/u GI PCR and stool culture  - f/u TSH, UA ( check urine gap)   - f/u echo     # Depressed affect   - father passed away at [facility] due to MI one day prior to presentation   - no suicidal ideation   - possible psych consult Transfer Note    Transfer from: MICU    Transfer to: ( X ) Medicine    (  ) Telemetry    (  ) RCU                               (  ) Palliative    (  ) Stroke Unit    (  ) MICU    (  ) STEPDOWN      Approved by Dr. Adam GIRALDO [facility] COURSE:  HPI:  23-year-old female with no significant past medical history presents complaining of decreased oral intake for three days, yesterday her father passed away and since then she developed nausea and vomiting with mild epigastric abdominal pain. She endorses 3 episodes of NBNB emesis as well as 1 episode of watery nonbloody diarrhea but then her symptoms improved slightly and she now denies diarrhea and says the abdominal pain improved but she has significant generalized weakness.  Reports she returned from [place] on June 17 but was not feeling sick after she returned. Denies fever/chills, chest pain, shortness of breath, dysuria/frequency/urgency/hematuria, vaginal bleeding/discharge, lightheadedness, dizziness, LOC, or abnormal limb movements.     Patient was hypotensive and tachycardic on presentation (according to sign out but recorded vitals do not show hypotension), on RA.  Labs significant for Na= 110, k=5.1, AG=15, AST=52, ALT=48, vbg Ph 7.21 improved to 7.33.     Patient admitted for further work up and management of hyponatremia and HAGMA.     In the ICU, started on NS @ 50 to correct Na by 8-10 meq/24 hours and zofran prn for nausea. Repeat CMP at 07/07 at 8 pm, Na 121, switched to D5 + NaHco3 50 meq @ 50. Repeat CMP showed Na 125 7/07 at 10 30 pm, nephro consulted, d/c D5 + Na bicarb and started on D5 only @ 100, Nephro consulted, c/w D5 @ 100 (avoid overcorrection), target Na 122 by the end of day, c/w po bicarb, restrict fluid to 1.5L. Blood cultures, UA, TSH, cortisol pending. ECHO showed ******. Patient has had no episodes of vomiting since admission, nausea resolved, resumed eating. Last bowel movement was last night. BMP (07/08) shows ******. Patient medically stable to be DGTF.        (07 Jul 2024 03:46)        Vital Signs Last 24 Hrs  T(C): 35.9 (08 Jul 2024 08:00), Max: 36.7 (07 Jul 2024 12:00)  T(F): 96.6 (08 Jul 2024 08:00), Max: 98.1 (07 Jul 2024 12:00)  HR: 107 (08 Jul 2024 08:00) (93 - 134)  BP: 102/57 (08 Jul 2024 08:00) (83/53 - 109/78)  BP(mean): 72 (08 Jul 2024 08:00) (61 - 89)  RR: 27 (08 Jul 2024 08:00) (17 - 75)  SpO2: 100% (08 Jul 2024 08:00) (97% - 100%)    Parameters below as of 08 Jul 2024 08:00  Patient On (Oxygen Delivery Method): room air        I&O's Summary    07 Jul 2024 07:01  -  08 Jul 2024 07:00  --------------------------------------------------------  IN: 2590 mL / OUT: 1100 mL / NET: 1490 mL    08 Jul 2024 07:01  -  08 Jul 2024 10:08  --------------------------------------------------------  IN: 100 mL / OUT: 250 mL / NET: -150 mL        Physical Exam    PHYSICAL EXAM:  GEN: NAD, Resting comfortably in bed, cooperative  PULM: Clear to auscultation bilaterally, No wheezing, rales, rhonchi  CVS: Regular rate and rhythm, S1-S2, no murmurs, heaves, thrills  ABD: Soft, non-tender, non-distended, no guarding, BS +  EXT: No edema/cyanosis, extremities warm/dry, peripheral pulses palpable   NEURO: A&Ox3, no focal deficits        LABS:                               13.7   4.87  )-----------( 200      ( 08 Jul 2024 04:54 )             38.3       07-08    126<L>  |  95<L>  |  15  ----------------------------<  103<H>  4.8   |  16<L>  |  <0.5<L>    Ca    9.0      08 Jul 2024 04:54  Phos  3.9     07-08  Mg     1.7     07-08    TPro  6.9  /  Alb  4.2  /  TBili  0.4  /  DBili  x   /  AST  47<H>  /  ALT  50<H>  /  AlkPhos  71  07-08      PT/INR - ( 07 Jul 2024 11:10 )   PT: 14.30 sec;   INR: 1.25 ratio         PTT - ( 07 Jul 2024 11:10 )  PTT:37.4 sec    ABG - ( 07 Jul 2024 03:58 )  pH, Arterial: 7.33  pH, Blood: x     /  pCO2: 31    /  pO2: 38    / HCO3: 16    / Base Excess: -8.4  /  SaO2: 66.3                  ECG: (07/07) Diagnosis Line Sinus tachycardia. Possible Left atrial enlargement      Telemetry:    Imaging:    Echo: (07/08) Normal, EF >55%      ASSESSMENT & PLAN:     # Nausea/vomiting   # Hypovolemic hyponatremia  # HAGMA possible starvation ketoacidosis   # Gastroenteritis?  # Mixed AG + NAG metabolic acidosis   - initial Na 110  - s/p 2L fluid bolus in the ED  - started on NS @ 50, to correct ~8-10 meq/24 hrs   - Hcg neg  - repeat CMP at 07/07 at 8 pm, Na 121, started on D5 + NaHco3 50 meq @ 50  - Na 125 7/07 at 10 30 pm, nephro consulted, d/c D5 + Na bicarb and started on D5 only @ 100  - Nephro consulted, c/w D5 @ 100 (avoid overcorrection), target Na 122 by the end of day, c/w po bicarb, restrict fluid to 1.5L  - if Na 120- 125, the DGTF   - cortisol 2.3, endo consulted to r/o france's   - f/u BMP shows Na 122, bicarb 18, AG closed (13)   - f/u beta hydroxy butyrate 0.5 (1.5 on admission)   - f/u GI PCR and stool culture  - f/u TSH, UA ( check urine gap)   - f/u echo normal, EF >55%    # Depressed affect   - father passed away at [facility] due to MI one day prior to presentation   - no suicidal ideation   - possible psych consult Transfer Note    Transfer from: MICU    Transfer to: ( X ) Medicine    (  ) Telemetry    (  ) RCU                               (  ) Palliative    (  ) Stroke Unit    (  ) MICU    (  ) STEPDOWN      Approved by Dr. Adam GIRALDO [facility] COURSE:  HPI:  23-year-old female with no significant past medical history presents complaining of decreased oral intake for three days, yesterday her father passed away and since then she developed nausea and vomiting with mild epigastric abdominal pain. She endorses 3 episodes of NBNB emesis as well as 1 episode of watery nonbloody diarrhea but then her symptoms improved slightly and she now denies diarrhea and says the abdominal pain improved but she has significant generalized weakness.  Reports she returned from [place] on June 17 but was not feeling sick after she returned. Denies fever/chills, chest pain, shortness of breath, dysuria/frequency/urgency/hematuria, vaginal bleeding/discharge, lightheadedness, dizziness, LOC, or abnormal limb movements.     Patient was hypotensive and tachycardic on presentation (according to sign out but recorded vitals do not show hypotension), on RA.  Labs significant for Na= 110, k=5.1, AG=15, AST=52, ALT=48, vbg Ph 7.21 improved to 7.33.     Patient admitted for further work up and management of hyponatremia and HAGMA.     In the ICU, started on NS @ 50 to correct Na by 8-10 meq/24 hours and zofran prn for nausea. Repeat CMP at 07/07 at 8 pm, Na 121, switched to D5 + NaHco3 50 meq @ 50. Repeat CMP showed Na 125 7/07 at 10 30 pm, nephro consulted, d/c D5 + Na bicarb and started on D5 only @ 100, Nephro consulted, c/w D5 @ 100 (avoid overcorrection), target Na 122 by the end of day, c/w po bicarb, restrict fluid to 1.5L. Blood cultures, UA, TSH, cortisol pending. ECHO normal. Patient has had no episodes of vomiting since admission, nausea resolved, resumed eating. Last bowel movement was last night. BMP (07/08) pending. Patient medically stable to be DGTF.        (07 Jul 2024 03:46)        Vital Signs Last 24 Hrs  T(C): 35.9 (08 Jul 2024 08:00), Max: 36.7 (07 Jul 2024 12:00)  T(F): 96.6 (08 Jul 2024 08:00), Max: 98.1 (07 Jul 2024 12:00)  HR: 107 (08 Jul 2024 08:00) (93 - 134)  BP: 102/57 (08 Jul 2024 08:00) (83/53 - 109/78)  BP(mean): 72 (08 Jul 2024 08:00) (61 - 89)  RR: 27 (08 Jul 2024 08:00) (17 - 75)  SpO2: 100% (08 Jul 2024 08:00) (97% - 100%)    Parameters below as of 08 Jul 2024 08:00  Patient On (Oxygen Delivery Method): room air        I&O's Summary    07 Jul 2024 07:01  -  08 Jul 2024 07:00  --------------------------------------------------------  IN: 2590 mL / OUT: 1100 mL / NET: 1490 mL    08 Jul 2024 07:01  -  08 Jul 2024 10:08  --------------------------------------------------------  IN: 100 mL / OUT: 250 mL / NET: -150 mL        Physical Exam    PHYSICAL EXAM:  GEN: NAD, Resting comfortably in bed, cooperative  PULM: Clear to auscultation bilaterally, No wheezing, rales, rhonchi  CVS: Regular rate and rhythm, S1-S2, no murmurs, heaves, thrills  ABD: Soft, non-tender, non-distended, no guarding, BS +  EXT: No edema/cyanosis, extremities warm/dry, peripheral pulses palpable   NEURO: A&Ox3, no focal deficits        LABS:                               13.7   4.87  )-----------( 200      ( 08 Jul 2024 04:54 )             38.3       07-08    126<L>  |  95<L>  |  15  ----------------------------<  103<H>  4.8   |  16<L>  |  <0.5<L>    Ca    9.0      08 Jul 2024 04:54  Phos  3.9     07-08  Mg     1.7     07-08    TPro  6.9  /  Alb  4.2  /  TBili  0.4  /  DBili  x   /  AST  47<H>  /  ALT  50<H>  /  AlkPhos  71  07-08      PT/INR - ( 07 Jul 2024 11:10 )   PT: 14.30 sec;   INR: 1.25 ratio         PTT - ( 07 Jul 2024 11:10 )  PTT:37.4 sec    ABG - ( 07 Jul 2024 03:58 )  pH, Arterial: 7.33  pH, Blood: x     /  pCO2: 31    /  pO2: 38    / HCO3: 16    / Base Excess: -8.4  /  SaO2: 66.3                  ECG: (07/07) Diagnosis Line Sinus tachycardia. Possible Left atrial enlargement      Telemetry:    Imaging:    Echo: (07/08) Normal, EF >55%      ASSESSMENT & PLAN:     # Nausea/vomiting   # Hypovolemic hyponatremia  # HAGMA possible starvation ketoacidosis   # Gastroenteritis?  # Mixed AG + NAG metabolic acidosis   - initial Na 110  - s/p 2L fluid bolus in the ED  - started on NS @ 50, to correct ~8-10 meq/24 hrs   - Hcg neg  - repeat CMP at 07/07 at 8 pm, Na 121, started on D5 + NaHco3 50 meq @ 50  - Na 125 7/07 at 10 30 pm, nephro consulted, d/c D5 + Na bicarb and started on D5 only @ 100  - Nephro consulted, c/w D5 @ 100 (avoid overcorrection), target Na 122 by the end of day, c/w po bicarb, restrict fluid to 1.5L  - if Na 120- 125, the DGTF   - cortisol 2.3, endo consulted to r/o fr[place]'s   - f/u BMP shows Na 122, bicarb 18, AG closed (13)   - f/u beta hydroxy butyrate 0.5 (1.5 on admission)   - f/u GI PCR and stool culture  - f/u TSH, UA ( check urine gap)   - f/u echo normal, EF >55%    # Depressed affect   - father passed away at [facility] due to MI one day prior to presentation   - no suicidal ideation   - possible psych consult

## 2024-07-08 NOTE — CONSULT NOTE ADULT - SUBJECTIVE AND OBJECTIVE BOX
HISTORY OF PRESENT ILLNESS  NADEEN ROJO is a 23y Female with a past medical history of     Endocrinology has been consulted for the suspected adrenal insufficiency.       FAMILY HISTORY  - Diabetes:  - Thyroid:  - Autoimmune:  - Other:    SOCIAL HISTORY  - Work:  - Alcohol:  - Smoking:  - Recreational Drugs:    ALLERGIES  No Known Allergies      CURRENT MEDICATIONS  acetaminophen     Tablet .. 650 milliGRAM(s) Oral every 6 hours PRN  chlorhexidine 2% Cloths 1 Application(s) Topical <User Schedule>  enoxaparin Injectable 40 milliGRAM(s) SubCutaneous every 24 hours  ondansetron Injectable 4 milliGRAM(s) IV Push every 8 hours PRN  sodium bicarbonate 650 milliGRAM(s) Oral every 8 hours      REVIEW OF SYSTEMS  Constitutional:  Negative fever, chills or loss of appetite.  Eyes:  Negative blurry vision or double vision.  Cardiovascular:  Negative for chest pain or palpitations.  Respiratory:  Negative for cough, wheezing, or shortness of breath.   Gastrointestinal:  Negative for nausea, vomiting, diarrhea, constipation, or abdominal pain.  Genitourinary:  Negative frequency, urgency or dysuria.  Neurologic:  No headache, confusion, dizziness, lightheadedness.    PHYSICAL EXAM  Vital Signs Last 24 Hrs  T(C): 36.6 (08 Jul 2024 12:00), Max: 36.6 (07 Jul 2024 16:00)  T(F): 97.9 (08 Jul 2024 12:00), Max: 97.9 (07 Jul 2024 16:00)  HR: 125 (08 Jul 2024 14:00) (93 - 134)  BP: 99/63 (08 Jul 2024 14:00) (86/50 - 111/71)  BP(mean): 75 (08 Jul 2024 14:00) (63 - 89)  RR: 21 (08 Jul 2024 14:00) (17 - 75)  SpO2: 100% (08 Jul 2024 14:00) (97% - 100%)    Parameters below as of 08 Jul 2024 14:00  Patient On (Oxygen Delivery Method): room air    Constitutional: Awake, alert, in no acute distress.   HEENT: Normocephalic, atraumatic, LUIS, no proptosis or lid retraction.   Neck: supple, no acanthosis, no thyromegaly or palpable thyroid nodules.  Respiratory: Lungs clear to ausculation bilaterally.   Cardiovascular: regular rhythm, normal S1 and S2, no audible murmurs.   GI: soft, non-tender, non-distended, bowel sounds present, no masses appreciated.  Extremities: No lower extremity edema, peripheral pulses present.   Skin: no rashes.   Psychiatric: AAO x 3. Normal affect/mood.     LABS  CBC - WBC/HGB/HTC/PLT: 4.87/13.7/38.3/200 (07-08-24)  BMP: Na/K/Cl/Bicarb/BUN/Cr/Gluc: 122/4.7/91/18/12/0.5/87 (07-08-24)  Anion Gap: 13 (07-08-24)  eGFR: 136 (07-08-24)  Calcium: 9.3 (07-08-24)  Phosphorus: -- (07-08-24)  Magnesium: -- (07-08-24)  LFT - Alb/Tprot/Tbili/Dbili/AlkPhos/ALT/AST: 4.6/--/0.7/--/62/54/49 (07-08-24)  PT/aPTT/INR: 14.30/37.4/1.25 (07-07-24)  Thyroid Stimulating Hormone, Serum: 2.35 (07-07-24)    CBC - WBC/HGB/HTC/PLT: 4.87/13.7/38.3/200 (07-08-24)BMP: Na/K/Cl/Bicarb/BUN/Cr/Gluc: 122/4.7/91/18/12/0.5/87 (07-08-24)  Anion Gap: 13 (07-08-24)  eGFR: 136 (07-08-24)  Calcium: 9.3 (07-08-24)  Phosphorus: -- (07-08-24)  Magnesium: -- (07-08-24)    CAPILLARY BLOOD GLUCOSE & INSULIN RECEIVED  124 mg/dL (07-07 @ 14:24)        07-07-24 @ 07:01  -  07-08-24 @ 07:00  --------------------------------------------------------  IN: 2590 mL / OUT: 1100 mL / NET: 1490 mL    07-08-24 @ 07:01  -  07-08-24 @ 14:26  --------------------------------------------------------  IN: 990 mL / OUT: 750 mL / NET: 240 mL        ASSESSMENT / RECOMMENDATIONS    A1C: BUN: 12  Creatinine: 0.5  GFR: 136  Weight: 49.9  BMI: 18.9     HISTORY OF PRESENT ILLNESS  22 y/o F w/ no significant PMHx presents complaining of decreased oral intake for three days, yesterday her father passed away and since then she developed nausea and vomiting with mild epigastric abdominal pain. She endorses 3 episodes of NBNB emesis as well as 1 episode of watery nonbloody diarrhea but then her symptoms improved slightly and she now denies diarrhea and says the abdominal pain improved but she has significant generalized weakness.  Reports she returned from [place] on June 17 but was not feeling sick after she returned. Denies fever/chills, chest pain, shortness of breath, dysuria/frequency/urgency/hematuria, vaginal bleeding/discharge, lightheadedness, dizziness, LOC, or abnormal limb movements.     Patient was hypotensive and tachycardic on presentation (according to sign out but recorded vitals do not show hypotension), on RA.  Labs significant for Na= 110, k=5.1, AG=15, AST=52, ALT=48, vbg Ph 7.21 improved to 7.33.     Patient admitted for further work up and management of hyponatremia and HAGMA.     Endocrinology has been consulted for the suspected adrenal insufficiency.       FAMILY HISTORY  - No known fhx of endocrinopathies     SOCIAL HISTORY  - Alcohol: Denies  - Smoking: Denies  - Recreational Drugs: Denies    ALLERGIES  No Known Allergies      CURRENT MEDICATIONS  acetaminophen     Tablet .. 650 milliGRAM(s) Oral every 6 hours PRN  chlorhexidine 2% Cloths 1 Application(s) Topical <User Schedule>  enoxaparin Injectable 40 milliGRAM(s) SubCutaneous every 24 hours  ondansetron Injectable 4 milliGRAM(s) IV Push every 8 hours PRN  sodium bicarbonate 650 milliGRAM(s) Oral every 8 hours      REVIEW OF SYSTEMS  Constitutional:  Negative fever, chills or loss of appetite.  Eyes:  Negative blurry vision or double vision.  Cardiovascular:  Negative for chest pain or palpitations.  Respiratory:  Negative for cough, wheezing, or shortness of breath.   Gastrointestinal:  Negative for nausea, vomiting, diarrhea, constipation, or abdominal pain.  Genitourinary:  Negative frequency, urgency or dysuria.  Neurologic:  No headache, confusion, dizziness, lightheadedness.    PHYSICAL EXAM  Vital Signs Last 24 Hrs  T(C): 36.6 (08 Jul 2024 12:00), Max: 36.6 (07 Jul 2024 16:00)  T(F): 97.9 (08 Jul 2024 12:00), Max: 97.9 (07 Jul 2024 16:00)  HR: 125 (08 Jul 2024 14:00) (93 - 134)  BP: 99/63 (08 Jul 2024 14:00) (86/50 - 111/71)  BP(mean): 75 (08 Jul 2024 14:00) (63 - 89)  RR: 21 (08 Jul 2024 14:00) (17 - 75)  SpO2: 100% (08 Jul 2024 14:00) (97% - 100%)    Parameters below as of 08 Jul 2024 14:00  Patient On (Oxygen Delivery Method): room air    Constitutional: Awake, alert, in no acute distress.   HEENT: Normocephalic, atraumatic, LUIS, no proptosis or lid retraction.   Neck: supple, no thyromegaly or palpable thyroid nodules.  Respiratory: Lungs clear to ausculation bilaterally.   Cardiovascular: regular rhythm, normal S1 and S2, no audible murmurs.   GI: soft, non-tender, non-distended, bowel sounds present, no masses appreciated.  Extremities: No lower extremity edema, peripheral pulses present.   Skin: + Darkening over the PIP and DIP B/L as well as Darkened Lips   Psychiatric: AAO x 3. Depressed Affect    LABS  CBC - WBC/HGB/HTC/PLT: 4.87/13.7/38.3/200 (07-08-24)  BMP: Na/K/Cl/Bicarb/BUN/Cr/Gluc: 122/4.7/91/18/12/0.5/87 (07-08-24)  Anion Gap: 13 (07-08-24)  eGFR: 136 (07-08-24)  Calcium: 9.3 (07-08-24)  Phosphorus: -- (07-08-24)  Magnesium: -- (07-08-24)  LFT - Alb/Tprot/Tbili/Dbili/AlkPhos/ALT/AST: 4.6/--/0.7/--/62/54/49 (07-08-24)  PT/aPTT/INR: 14.30/37.4/1.25 (07-07-24)  Thyroid Stimulating Hormone, Serum: 2.35 (07-07-24)    CBC - WBC/HGB/HTC/PLT: 4.87/13.7/38.3/200 (07-08-24)BMP: Na/K/Cl/Bicarb/BUN/Cr/Gluc: 122/4.7/91/18/12/0.5/87 (07-08-24)  Anion Gap: 13 (07-08-24)  eGFR: 136 (07-08-24)  Calcium: 9.3 (07-08-24)  Phosphorus: -- (07-08-24)  Magnesium: -- (07-08-24)    CAPILLARY BLOOD GLUCOSE & INSULIN RECEIVED  124 mg/dL (07-07 @ 14:24)        07-07-24 @ 07:01  -  07-08-24 @ 07:00  --------------------------------------------------------  IN: 2590 mL / OUT: 1100 mL / NET: 1490 mL    07-08-24 @ 07:01  -  07-08-24 @ 14:26  --------------------------------------------------------  IN: 990 mL / OUT: 750 mL / NET: 240 mL        ASSESSMENT / RECOMMENDATIONS    A1C: BUN: 12  Creatinine: 0.5  GFR: 136  Weight: 49.9  BMI: 18.9

## 2024-07-08 NOTE — PROGRESS NOTE ADULT - SUBJECTIVE AND OBJECTIVE BOX
Over Night Events: events noted, feels better, on D5 W 100 cc/h    PHYSICAL EXAM    ICU Vital Signs Last 24 Hrs  T(C): 36.2 (08 Jul 2024 04:00), Max: 36.7 (07 Jul 2024 12:00)  T(F): 97.2 (08 Jul 2024 04:00), Max: 98.1 (07 Jul 2024 12:00)  HR: 93 (08 Jul 2024 07:00) (93 - 134)  BP: 89/53 (08 Jul 2024 07:00) (83/53 - 109/78)  BP(mean): 65 (08 Jul 2024 07:00) (61 - 89)  RR: 18 (08 Jul 2024 07:00) (17 - 75)  SpO2: 100% (08 Jul 2024 07:00) (97% - 100%)    O2 Parameters below as of 08 Jul 2024 06:00  Patient On (Oxygen Delivery Method): room air            General: comfortable  Lungs: Dec bs both bases  Cardiovascular: Regular   Abdomen: Soft, Positive BS  Extremities: No clubbing   Neurological: Non focal       07-07-24 @ 07:01  -  07-08-24 @ 07:00  --------------------------------------------------------  IN:    dextrose 5%: 500 mL    Oral Fluid: 1440 mL    Sodium Bicarbonate: 200 mL    sodium chloride 0.9%: 100 mL    sodium chloride 0.9%: 250 mL  Total IN: 2490 mL    OUT:    Voided (mL): 1100 mL  Total OUT: 1100 mL    Total NET: 1390 mL          LABS:                          13.7   4.87  )-----------( 200      ( 08 Jul 2024 04:54 )             38.3                                               07-08    126<L>  |  95<L>  |  15  ----------------------------<  103<H>  4.8   |  16<L>  |  <0.5<L>    Ca    9.0      08 Jul 2024 04:54  Phos  3.9     07-08  Mg     1.7     07-08    TPro  6.9  /  Alb  4.2  /  TBili  0.4  /  DBili  x   /  AST  47<H>  /  ALT  50<H>  /  AlkPhos  71  07-08      PT/INR - ( 07 Jul 2024 11:10 )   PT: 14.30 sec;   INR: 1.25 ratio         PTT - ( 07 Jul 2024 11:10 )  PTT:37.4 sec                                       Urinalysis Basic - ( 08 Jul 2024 04:54 )    Color: x / Appearance: x / SG: x / pH: x  Gluc: 103 mg/dL / Ketone: x  / Bili: x / Urobili: x   Blood: x / Protein: x / Nitrite: x   Leuk Esterase: x / RBC: x / WBC x   Sq Epi: x / Non Sq Epi: x / Bacteria: x                                                  LIVER FUNCTIONS - ( 08 Jul 2024 04:54 )  Alb: 4.2 g/dL / Pro: 6.9 g/dL / ALK PHOS: 71 U/L / ALT: 50 U/L / AST: 47 U/L / GGT: x                                                  Urinalysis with Rflx Culture (collected 07 Jul 2024 05:40)                                                                                       ABG - ( 07 Jul 2024 03:58 )  pH, Arterial: 7.33  pH, Blood: x     /  pCO2: 31    /  pO2: 38    / HCO3: 16    / Base Excess: -8.4  /  SaO2: 66.3                MEDICATIONS  (STANDING):  chlorhexidine 2% Cloths 1 Application(s) Topical <User Schedule>  dextrose 5%. 1000 milliLiter(s) (100 mL/Hr) IV Continuous <Continuous>  enoxaparin Injectable 40 milliGRAM(s) SubCutaneous every 24 hours  sodium bicarbonate 650 milliGRAM(s) Oral every 8 hours    MEDICATIONS  (PRN):  acetaminophen     Tablet .. 650 milliGRAM(s) Oral every 6 hours PRN Moderate Pain (4 - 6)  ondansetron Injectable 4 milliGRAM(s) IV Push every 8 hours PRN Nausea and/or Vomiting

## 2024-07-08 NOTE — CONSULT NOTE ADULT - ATTENDING COMMENTS
Attending Statement: I have personally performed a face to face diagnostic evaluation on this patient. The patient is suffering from:  Acute hyponatremia, likely poor intake  Starvation ketoacidosis   HCO3 loss from vomiting  I have made amendments to the documentation where necessary. I have personally seen and examined this patient.  I have fully participated in the care of this patient.  I have reviewed all pertinent clinical information, including history, physical exam, plan and note.
24 y/o F w/ no significant PMHx presents complaining of decreased oral intake for three days, yesterday her father passed away and since then she developed nausea and vomiting with mild epigastric abdominal pain.     #Low cortisol levels  -In the setting of severe hyponatremia  -Trending up slowly  -A.m. cortisol level noted to be 2.3 patient also has symptoms of nausea vomiting weight loss although she states that she has been trying to lose weight  -Has noticed some darkening of her skin over the last few weeks which is confirmed by her family  -Considering borderline blood pressure low-sodium high normal potassium, would recommend drawing a cortisol and ACTH level and treating with hydrocortisone  -Significantly elevated ACTH levels would be confirmatory for primary adrenal insufficiency/[place]'s disease  -Can give 15 mg of hydrocortisone now and start with 15 mg in the a.m. and 5 mg in the afternoon from tomorrow  -Would also recommend starting with Florinef 0.05 mg once a day  -Patient plans to go [place] to her [place] this [place] will need close follow-up

## 2024-07-08 NOTE — CONSULT NOTE ADULT - ASSESSMENT
#Suspect Adrenal Insufficiency  - Cortisol   - Recommend ACTH stimulation test   - Recommendations for     Hydrocortisone:     Fludrocortisone:  - Discharge recommendations to be discussed.     Case discussed with Dr. Echevarria. Primary team updated.      22 y/o F w/ no significant PMHx presents for decreased PO intake as well as nausea, NBNB emesis x 3 days. Found to have severe hyponatremia, hyperkalemia, mixed HAGMA-NAGMA and admitted to ICU. Endocrinology consulted for suspected adrenal insufficiency given low cortisol    #Suspect Adrenal Insufficiency  - Presented w/ decreased PO intake as well as NBNB emesis x 3 days; Hx is unreliable as family/friends at bedside report patient has not been eating x 2 weeks, lost 6-7 lbs and has been intentionally attempting to lose weight  - Labs significant for Na 110, K+ 5.1, CO2 16, AG 15, pH 7.21 on VBG  - SBP ~ 90s on admission  - Did not receive steroids inpatient  - Denies inhaled/PO steroid use  - Cortisol 2.3 @ 0535  - Reports worsening lip and palm darkening recently  - No recent infections  - Suspicion for primary adrenal insufficiency given clinical picture as well as low cortisol  Plan:  - Please check cortisol and ACTH level PRIOR to starting steroid medications (specimen received); expect elevated ACTH if this is primary adrenal insufficiency  - Recommendations for     Hydrocortisone: Start PO HC 15 mg qd in the AM and PO HC 5 mg qd in the PM     Fludrocortisone: TBD (see addendum for dosing)  - If patient becomes HD unstable, please start stress dose steroids ASAP   - Discharge recommendations to be discussed pending lab results.     Case discussed with Dr. Echevarria. Primary team updated.

## 2024-07-08 NOTE — CHART NOTE - NSCHARTNOTEFT_GEN_A_CORE
8pm CMP revealed sodium of 121, which is over the 6-8 meq range for correcting hyponatremia. Decision was made to start D5w with Sodium bicarb 50 meq at rate of 50. F/U CMP at 2230 revealed sodium of 125, spoke with nephro d/c d5w with sodium bicarb and started D5 infusion at rate of 100.

## 2024-07-09 LAB
ACTH SER-ACNC: >2000 PG/ML — HIGH (ref 7.2–63.3)
ANION GAP SERPL CALC-SCNC: 18 MMOL/L — HIGH (ref 7–14)
BUN SERPL-MCNC: 17 MG/DL — SIGNIFICANT CHANGE UP (ref 10–20)
CALCIUM SERPL-MCNC: 9.7 MG/DL — SIGNIFICANT CHANGE UP (ref 8.4–10.4)
CHLORIDE SERPL-SCNC: 92 MMOL/L — LOW (ref 98–110)
CO2 SERPL-SCNC: 18 MMOL/L — SIGNIFICANT CHANGE UP (ref 17–32)
CREAT SERPL-MCNC: 0.5 MG/DL — LOW (ref 0.7–1.5)
EGFR: 136 ML/MIN/1.73M2 — SIGNIFICANT CHANGE UP
GLUCOSE SERPL-MCNC: 88 MG/DL — SIGNIFICANT CHANGE UP (ref 70–99)
MAGNESIUM SERPL-MCNC: 2 MG/DL — SIGNIFICANT CHANGE UP (ref 1.8–2.4)
POTASSIUM SERPL-MCNC: 4.5 MMOL/L — SIGNIFICANT CHANGE UP (ref 3.5–5)
POTASSIUM SERPL-SCNC: 4.5 MMOL/L — SIGNIFICANT CHANGE UP (ref 3.5–5)
SODIUM SERPL-SCNC: 128 MMOL/L — LOW (ref 135–146)

## 2024-07-09 PROCEDURE — 99233 SBSQ HOSP IP/OBS HIGH 50: CPT

## 2024-07-09 RX ORDER — PANTOPRAZOLE SODIUM 40 MG/10ML
40 INJECTION, POWDER, FOR SOLUTION INTRAVENOUS
Refills: 0 | Status: DISCONTINUED | OUTPATIENT
Start: 2024-07-09 | End: 2024-07-10

## 2024-07-09 RX ADMIN — ENOXAPARIN SODIUM 40 MILLIGRAM(S): 100 INJECTION SUBCUTANEOUS at 05:27

## 2024-07-09 RX ADMIN — Medication 1 APPLICATION(S): at 05:30

## 2024-07-09 RX ADMIN — Medication 0.05 MILLIGRAM(S): at 05:26

## 2024-07-09 RX ADMIN — SODIUM BICARBONATE 650 MILLIGRAM(S): 650 TABLET ORAL at 05:27

## 2024-07-09 RX ADMIN — SODIUM BICARBONATE 650 MILLIGRAM(S): 650 TABLET ORAL at 15:10

## 2024-07-09 RX ADMIN — SODIUM BICARBONATE 650 MILLIGRAM(S): 650 TABLET ORAL at 21:04

## 2024-07-09 RX ADMIN — HYDROCORTISONE 15 MILLIGRAM(S): 100 SUSPENSION RECTAL at 05:26

## 2024-07-09 NOTE — DISCHARGE NOTE PROVIDER - HOSPITAL COURSE
HPI:  23-year-old female with no significant past medical history presents complaining of decreased oral intake for three days, yesterday her father passed away and since then she developed nausea and vomiting with mild epigastric abdominal pain. She endorses 3 episodes of NBNB emesis as well as 1 episode of watery nonbloody diarrhea but then her symptoms improved slightly and she now denies diarrhea and says the abdominal pain improved but she has significant generalized weakness.  Reports she returned from [place] on June 17 but was not feeling sick after she returned. Denies fever/chills, chest pain, shortness of breath, dysuria/frequency/urgency/hematuria, vaginal bleeding/discharge, lightheadedness, dizziness, LOC, or abnormal limb movements.     Patient was hypotensive and tachycardic on presentation (according to sign out but recorded vitals do not show hypotension), on RA.  Labs significant for Na= 110, k=5.1, AG=15, AST=52, ALT=48, vbg Ph 7.21 improved to 7.33.     Patient admitted for further work up and management of hyponatremia and HAGMA.     In the ICU, started on NS @ 50 to correct Na by 8-10 meq/24 hours and zofran prn for nausea. Repeat CMP at 07/07 at 8 pm, Na 121, switched to D5 + NaHco3 50 meq @ 50. Repeat CMP showed Na 125 7/07 at 10 30 pm, nephro consulted, d/c D5 + Na bicarb and started on D5 only @ 100, Nephro consulted, c/w D5 @ 100 (avoid overcorrection), target Na 122 by the end of day, c/w po bicarb, restrict fluid to 1.5L. Blood cultures, UA, TSH, cortisol pending. ECHO normal. Patient has had no episodes of vomiting since admission, nausea resolved, resumed eating. Last bowel movement was last night.    Patient was downgraded to the floor, HD stable, BMP on 7/9  @ 11am, HCO3 18 and anion gap     Patient was found to have primary adrenal insufficiency based on cortisol of 2.3 and ACTH >2000    Assessment and Plan:   · Assessment	    #Symptomatic Hypovolemic hyponatremia  # HAGMA possible starvation ketoacidosis   # Mixed AG + NAG metabolic acidosis   - initial Na 110  - s/p 2L fluid bolus in the ED  Per nephrology: - Na levels noted with overcorrection, was lowered back to 122. No more D5W - monitor Na level qshift - cont sodium bicarb po 650meq q8h - will need to limit excessive fluid intake. for now restrict water to 1.5L daily  - Hyponatremia secondary to primary adrenal insufficiency  - cortisol 2.3,ACTH >2000  -Per endocrin: on 7/8 - Can give 15 mg of hydrocortisone now and start with 15 mg in the a.m. and 5 mg in the afternoon from tomorrow -Would also recommend starting with Florinef 0.05 mg once a day  - If patient becomes HD unstable, please start stress dose steroids ASAP   - BMP 7/9 shows Na 128, bicarb 18, AG closed (18)   - beta hydroxy butyrate 0.5 (1.5 on admission)   - GI PCR and stool culture (not collected)  - echo normal, EF >55%    # Depressed affect   - father passed away at [facility] due to MI one day prior to presentation   - no suicidal ideation   - possible psych consult.               HPI:  23-year-old female with no significant past medical history presents complaining of decreased oral intake for three days, yesterday her father passed away and since then she developed nausea and vomiting with mild epigastric abdominal pain. She endorses 3 episodes of NBNB emesis as well as 1 episode of watery nonbloody diarrhea but then her symptoms improved slightly and she now denies diarrhea and says the abdominal pain improved but she has significant generalized weakness.  Reports she returned from [place] on June 17 but was not feeling sick after she returned. Denies fever/chills, chest pain, shortness of breath, dysuria/frequency/urgency/hematuria, vaginal bleeding/discharge, lightheadedness, dizziness, LOC, or abnormal limb movements.     Patient was hypotensive and tachycardic on presentation (according to sign out but recorded vitals do not show hypotension), on RA.  Labs significant for Na= 110, k=5.1, AG=15, AST=52, ALT=48, vbg Ph 7.21 improved to 7.33.     Patient admitted for further work up and management of hyponatremia and HAGMA.     In the ICU, started on NS @ 50 to correct Na by 8-10 meq/24 hours and zofran prn for nausea. Repeat CMP at 07/07 at 8 pm, Na 121, switched to D5 + NaHco3 50 meq @ 50. Repeat CMP showed Na 125 7/07 at 10 30 pm, nephro consulted, d/c D5 + Na bicarb and started on D5 only @ 100, Nephro consulted, c/w D5 @ 100 (avoid overcorrection), target Na 122 by the end of day, c/w po bicarb, restrict fluid to 1.5L. Blood cultures, UA, TSH, cortisol pending. ECHO normal. Patient has had no episodes of vomiting since admission, nausea resolved, resumed eating. Last bowel movement was last night.    Patient was downgraded to the floor, HD stable, BMP on 7/9  @ 11am, HCO3 18 and anion gap. BMP 7/10 126, Bivarb 20    Patient was found to have primary adrenal insufficiency based on cortisol of 2.3 and ACTH >2000. Per endocrinology recommendations   -Continue with hydrocortisone 50 mg in the a.m. and 5 mg in the afternoon and Florinef 0.05 mg once a day  -Discussed diagnosis with the patient advised on importance of strict compliance with the medication, discussed sick day dosing, will need close eventual follow-up with endocrinology  - Would recommend obtaining 21-hydroxylase antibodies.    Patient is requiring to be discharged today as they are going to [facility]. Instructed on medications and follow up with Primary care physician and endocrinologist for further workup when they return in 3 weeks    Assessment and Plan:   · Assessment	    #Symptomatic Hypovolemic hyponatremia  # HAGMA possible starvation ketoacidosis   # Mixed AG + NAG metabolic acidosis   - initial Na 110  - s/p 2L fluid bolus in the ED  Per nephrology: - Na levels noted with overcorrection, was lowered back to 122. No more D5W - monitor Na level qshift - cont sodium bicarb po 650meq q8h - will need to limit excessive fluid intake. for now restrict water to 1.5L daily  - Hyponatremia secondary to primary adrenal insufficiency  - cortisol 2.3,ACTH >2000  -Per endocrin: on 7/8 - Can give 15 mg of hydrocortisone now and start with 15 mg in the a.m. and 5 mg in the afternoon from tomorrow -Would also recommend starting with Florinef 0.05 mg once a day  - If patient becomes HD unstable, please start stress dose steroids ASAP   - BMP 7/10 shows Na 126, bicarb 120, AG closed (18)   - beta hydroxy butyrate 0.5 (1.5 on admission)   - GI PCR and stool culture (not collected)  - echo normal, EF >55%    # Depressed affect   - father passed away at [facility] due to MI one day prior to presentation   - no suicidal ideation   - possible psych consult.               HPI:  23-year-old female with no significant past medical history presents complaining of decreased oral intake for three days, yesterday her father passed away and since then she developed nausea and vomiting with mild epigastric abdominal pain. She endorses 3 episodes of NBNB emesis as well as 1 episode of watery nonbloody diarrhea but then her symptoms improved slightly and she now denies diarrhea and says the abdominal pain improved but she has significant generalized weakness.  Reports she returned from [place] on June 17 but was not feeling sick after she returned. Denies fever/chills, chest pain, shortness of breath, dysuria/frequency/urgency/hematuria, vaginal bleeding/discharge, lightheadedness, dizziness, LOC, or abnormal limb movements.     Patient was hypotensive and tachycardic on presentation (according to sign out but recorded vitals do not show hypotension), on RA.  Labs significant for Na= 110, k=5.1, AG=15, AST=52, ALT=48, vbg Ph 7.21 improved to 7.33.     Patient admitted for further work up and management of hyponatremia and HAGMA.     In the ICU, started on NS @ 50 to correct Na by 8-10 meq/24 hours and zofran prn for nausea. Repeat CMP at 07/07 at 8 pm, Na 121, switched to D5 + NaHco3 50 meq @ 50. Repeat CMP showed Na 125 7/07 at 10 30 pm, nephro consulted, d/c D5 + Na bicarb and started on D5 only @ 100, Nephro consulted, c/w D5 @ 100 (avoid overcorrection), target Na 122 by the end of day, c/w po bicarb, restrict fluid to 1.5L. Blood cultures, UA, TSH, cortisol pending. ECHO normal. Patient has had no episodes of vomiting since admission, nausea resolved, resumed eating. Last bowel movement was last night.    Patient was downgraded to the floor, HD stable, BMP on 7/9  @ 11am, HCO3 18 and anion gap. BMP 7/10 126, Bivarb 20    Patient was found to have primary adrenal insufficiency based on cortisol of 2.3 and ACTH >2000. Per endocrinology recommendations   -Continue with hydrocortisone 15 mg in the a.m. and 5 mg in the afternoon and Florinef 0.05 mg once a day  -Discussed diagnosis with the patient advised on importance of strict compliance with the medication, discussed sick day dosing, will need close eventual follow-up with endocrinology  - Would recommend obtaining 21-hydroxylase antibodies.    Patient is requiring to be discharged today as they are going to [facility]. Instructed on medications and follow up with Primary care physician and endocrinologist for further workup when they return in 3 weeks    Assessment and Plan:   · Assessment	    #Symptomatic Hypovolemic hyponatremia  # HAGMA possible starvation ketoacidosis   # Mixed AG + NAG metabolic acidosis   - initial Na 110  - s/p 2L fluid bolus in the ED  Per nephrology: - Na levels noted with overcorrection, was lowered back to 122. No more D5W - monitor Na level qshift - cont sodium bicarb po 650meq q8h - will need to limit excessive fluid intake. for now restrict water to 1.5L daily  - Hyponatremia secondary to primary adrenal insufficiency  - cortisol 2.3,ACTH >2000  -Per endocrin: on 7/8 - Can give 15 mg of hydrocortisone now and start with 15 mg in the a.m. and 5 mg in the afternoon from tomorrow -Would also recommend starting with Florinef 0.05 mg once a day  - If patient becomes HD unstable, please start stress dose steroids ASAP   - BMP 7/10 shows Na 126, bicarb 120, AG closed (18)   - beta hydroxy butyrate 0.5 (1.5 on admission)   - GI PCR and stool culture (not collected)  - echo normal, EF >55%    # Depressed affect   - father passed away at [facility] due to MI one day prior to presentation   - no suicidal ideation   - possible psych consult.               HPI:  23-year-old female with no significant past medical history presents complaining of decreased oral intake for three days, yesterday her father passed away and since then she developed nausea and vomiting with mild epigastric abdominal pain. She endorses 3 episodes of NBNB emesis as well as 1 episode of watery nonbloody diarrhea but then her symptoms improved slightly and she now denies diarrhea and says the abdominal pain improved but she has significant generalized weakness.  Reports she returned from [place] on June 17 but was not feeling sick after she returned. Denies fever/chills, chest pain, shortness of breath, dysuria/frequency/urgency/hematuria, vaginal bleeding/discharge, lightheadedness, dizziness, LOC, or abnormal limb movements.     Patient was hypotensive and tachycardic on presentation (according to sign out but recorded vitals do not show hypotension), on RA.  Labs significant for Na= 110, k=5.1, AG=15, AST=52, ALT=48, vbg Ph 7.21 improved to 7.33.     Patient admitted for further work up and management of hyponatremia and HAGMA.     In the ICU, started on NS @ 50 to correct Na by 8-10 meq/24 hours and zofran prn for nausea. Repeat CMP at 07/07 at 8 pm, Na 121, switched to D5 + NaHco3 50 meq @ 50. Repeat CMP showed Na 125 7/07 at 10 30 pm, nephro consulted, d/c D5 + Na bicarb and started on D5 only @ 100, Nephro consulted, c/w D5 @ 100 (avoid overcorrection), target Na 122 by the end of day, c/w po bicarb, restrict fluid to 1.5L. Blood cultures, UA, TSH, cortisol pending. ECHO normal. Patient has had no episodes of vomiting since admission, nausea resolved, resumed eating. Last bowel movement was last night.    Patient was downgraded to the floor, HD stable, BMP on 7/9  @ 11am, HCO3 18 and anion gap. BMP 7/10 126, Bivarb 20    Patient was found to have primary adrenal insufficiency based on cortisol of 2.3 and ACTH >2000. Per endocrinology recommendations   -Continue with hydrocortisone 15 mg in the a.m. and 5 mg in the afternoon and Florinef 0.05 mg once a day  -Discussed diagnosis with the patient advised on importance of strict compliance with the medication, discussed sick day dosing, will need close eventual follow-up with endocrinology  - Would recommend obtaining 21-hydroxylase antibodies.    Patient is requiring to be discharged today as they are going to [facility]. Instructed on medications and follow up with Primary care physician and endocrinologist for further workup when they return in 3 weeks    Assessment and Plan:   · Assessment	    # Symptomatic  hyponatremia- hypovlemic and related to primary adrenal insufficiency  # HAGMA possible starvation ketoacidosis   # Mixed AG + NAG metabolic acidosis   - initial Na 110, s/p 2L fluid bolus in the ED- improved  - cortisol 2.3,ACTH >2000  -Per endocrin: on 7/8 - Can give 15 mg of hydrocortisone now and start with 15 mg in the a.m. and 5 mg in the afternoon from tomorrow -Would also recommend starting with Florinef 0.05 mg once a day  - echo normal, EF >55%  continue steroids and florinef  D/w patient and family in detail regarding need for follow up and need for further work up

## 2024-07-09 NOTE — DISCHARGE NOTE PROVIDER - NSDCCPCAREPLAN_GEN_ALL_CORE_FT
PRINCIPAL DISCHARGE DIAGNOSIS  Diagnosis: Primary adrenal insufficiency  Assessment and Plan of Treatment: you were admitted for nausea vomiting and abdominal pain and diarrhea. In the ED you were found to have low blood pressure and low sodium level with mildly elevated potassium level. You were admitted to the ICU where they started correcting your Na. Nephrology was consulted to help with that. you were also found to have low cortisol level with increased ACTH which are consistant with primary adrenal insufficiency and can be the cause of your symptoms and electrolyte disturbance. Endocrinologist was also consulted to help with treatment.  You have been started on hydrocortisone and fludrocotisone (hormones normally secreted by the adrenal gland)  . Please continue taking your medications as indicated every day as these hormones are very important.  if you start experiencing any nausea vomiting, dizziness or abdominal pain or any new other symptoms please go to the emergency room      SECONDARY DISCHARGE DIAGNOSES  Diagnosis: Hyponatremia  Assessment and Plan of Treatment:      PRINCIPAL DISCHARGE DIAGNOSIS  Diagnosis: Primary adrenal insufficiency  Assessment and Plan of Treatment: you were admitted for nausea vomiting and abdominal pain and diarrhea. In the ED you were found to have low blood pressure and low sodium level with mildly elevated potassium level. You were admitted to the ICU where they corrected your potassium and  started correcting your Na. Nephrology was consulted to help with that. you were also found to have low cortisol level with increased ACTH which are consistant with primary adrenal insufficiency and can be the cause of your symptoms and electrolyte disturbance. Endocrinologist was also consulted to help with treatment.  You have been started on hydrocortisone and fludrocotisone (hormones normally secreted by the adrenal gland)  . Please continue taking your medications as indicated every day as these hormones are very important.  if you start experiencing any nausea vomiting, dizziness or abdominal pain or any new other symptoms please go to the emergency room      SECONDARY DISCHARGE DIAGNOSES  Diagnosis: Hyponatremia  Assessment and Plan of Treatment:      PRINCIPAL DISCHARGE DIAGNOSIS  Diagnosis: Primary adrenal insufficiency  Assessment and Plan of Treatment: you were admitted for nausea vomiting and abdominal pain and diarrhea. In the ED you were found to have low blood pressure and low sodium level with mildly elevated potassium level. You were admitted to the ICU where they corrected your potassium and  started correcting your Na. Nephrology was consulted to help with that. you were also found to have low cortisol level with increased ACTH which are consistant with primary adrenal insufficiency and can be the cause of your symptoms and electrolyte disturbance. Endocrinologist was also consulted to help with treatment.  You have been started on hydrocortisone and fludrocotisone (hormones normally secreted by the adrenal gland)  . Please continue taking your medications as indicated every day as these hormones are very important.  if you start experiencing any nausea vomiting, dizziness or abdominal pain or any new other symptoms please go to the emergency room.  - Make sure to follow up with [facility] care doctory or endocrinologist for further evaluation, treatement and for monitoring the dose of steroids.      SECONDARY DISCHARGE DIAGNOSES  Diagnosis: Hyponatremia  Assessment and Plan of Treatment: improving.

## 2024-07-09 NOTE — PROGRESS NOTE ADULT - SUBJECTIVE AND OBJECTIVE BOX
Patient is a 23y old  Female who presents with a chief complaint of Hyponatremia (09 Jul 2024 12:15)    Patient was seen and examined.  [facility]  Denies any  complaints.  All systems reviewed.    PAST MEDICAL & SURGICAL HISTORY:  No pertinent past medical history    Allergies  No Known Allergies    MEDICATIONS  (STANDING):  chlorhexidine 2% Cloths 1 Application(s) Topical <User Schedule>  enoxaparin Injectable 40 milliGRAM(s) SubCutaneous every 24 hours  fludroCORTISONE 0.05 milliGRAM(s) Oral daily  hydrocortisone 5 milliGRAM(s) Oral daily  hydrocortisone 15 milliGRAM(s) Oral daily  pantoprazole    Tablet 40 milliGRAM(s) Oral before breakfast  sodium bicarbonate 650 milliGRAM(s) Oral every 8 hours    MEDICATIONS  (PRN):  acetaminophen     Tablet .. 650 milliGRAM(s) Oral every 6 hours PRN Moderate Pain (4 - 6)  ondansetron Injectable 4 milliGRAM(s) IV Push every 8 hours PRN Nausea and/or Vomiting    Vital Signs Last 24 Hrs  T(C): 36.9  T(F): 98.4  HR: 109  BP: 122/73  BP(mean): 67  RR: 18  SpO2: 97%    O/E:  Awake, alert, not in distress.  HEENT: atraumatic, EOMI.  Chest: clear.  CVS: SIS2 +, no murmur.  P/A: Soft, BS+  CNS: awake, alert  Ext: no edema feet.  All systems reviewed positive findings as above.    POCT Blood Glucose.: 124 mg/dL (07 Jul 2024 14:24)                          13.7   4.87  )-----------( 200      ( 08 Jul 2024 04:54 )             38.3     07-08    122<L>  |  91<L>  |  12  ----------------------------<  87  4.7   |  18  |  0.5<L>  07-08    126<L>  |  95<L>  |  15  ----------------------------<  103<H>  4.8   |  16<L>  |  <0.5<L>    Ca    9.3      08 Jul 2024 12:23  Ca    9.0      08 Jul 2024 04:54  Ca    9.0      08 Jul 2024 00:06  Ca    9.2      07 Jul 2024 21:00  Ca    9.1      07 Jul 2024 16:56  Phos  3.9     07-08  Mg     1.7     07-08    TPro  7.3  /  Alb  4.6  /  TBili  0.7  /  DBili  x   /  AST  49<H>  /  ALT  54<H>  /  AlkPhos  62  07-08  TPro  6.9  /  Alb  4.2  /  TBili  0.4  /  DBili  x   /  AST  47<H>  /  ALT  50<H>  /  AlkPhos  71  07-08            Urinalysis Basic - ( 08 Jul 2024 12:23 )    Color: x / Appearance: x / SG: x / pH: x  Gluc: 87 mg/dL / Ketone: x  / Bili: x / Urobili: x   Blood: x / Protein: x / Nitrite: x   Leuk Esterase: x / RBC: x / WBC x   Sq Epi: x / Non Sq Epi: x / Bacteria: x        Culture - Blood (collected 07 Jul 2024 11:10)  Source: .Blood None  Preliminary Report (08 Jul 2024 20:02):    No growth at 24 hours    Culture - Blood (collected 07 Jul 2024 11:10)  Source: .Blood None  Preliminary Report (08 Jul 2024 20:02):    No growth at 24 hours    Urinalysis with Rflx Culture (collected 07 Jul 2024 05:40)

## 2024-07-09 NOTE — DISCHARGE NOTE PROVIDER - NSDCFUADDINST_GEN_ALL_CORE_FT
Take fall precautions  Fluid restriction to less than 1.5 Litres a day; monitor blood levels in 1 week and if your sodium levels are stable; you don't need to restrict fluid after

## 2024-07-09 NOTE — PROGRESS NOTE ADULT - SUBJECTIVE AND OBJECTIVE BOX
HISTORY OF PRESENT ILLNESS  22 y/o F w/ no significant PMHx presents complaining of decreased oral intake for three days, yesterday her father passed away and since then she developed nausea and vomiting with mild epigastric abdominal pain. She endorses 3 episodes of NBNB emesis as well as 1 episode of watery nonbloody diarrhea but then her symptoms improved slightly and she now denies diarrhea and says the abdominal pain improved but she has significant generalized weakness.  Reports she returned from [place] on June 17 but was not feeling sick after she returned. Denies fever/chills, chest pain, shortness of breath, dysuria/frequency/urgency/hematuria, vaginal bleeding/discharge, lightheadedness, dizziness, LOC, or abnormal limb movements.     Patient was hypotensive and tachycardic on presentation (according to sign out but recorded vitals do not show hypotension), on RA.  Labs significant for Na= 110, k=5.1, AG=15, AST=52, ALT=48, vbg Ph 7.21 improved to 7.33.     Patient admitted for further work up and management of hyponatremia and HAGMA.     Endocrinology has been consulted for the suspected adrenal insufficiency.       FAMILY HISTORY  - No known fhx of endocrinopathies     SOCIAL HISTORY  - Alcohol: Denies  - Smoking: Denies  - Recreational Drugs: Denies    ALLERGIES  No Known Allergies      CURRENT MEDICATIONS  acetaminophen     Tablet .. 650 milliGRAM(s) Oral every 6 hours PRN  chlorhexidine 2% Cloths 1 Application(s) Topical <User Schedule>  enoxaparin Injectable 40 milliGRAM(s) SubCutaneous every 24 hours  ondansetron Injectable 4 milliGRAM(s) IV Push every 8 hours PRN  sodium bicarbonate 650 milliGRAM(s) Oral every 8 hours      REVIEW OF SYSTEMS  Constitutional:  Negative fever, chills or loss of appetite.  Eyes:  Negative blurry vision or double vision.  Cardiovascular:  Negative for chest pain or palpitations.  Respiratory:  Negative for cough, wheezing, or shortness of breath.   Gastrointestinal:  Negative for nausea, vomiting, diarrhea, constipation, or abdominal pain.  Genitourinary:  Negative frequency, urgency or dysuria.  Neurologic:  No headache, confusion, dizziness, lightheadedness.    PHYSICAL EXAM  Vital Signs Last 24 Hrs  T(C): 36.6 (08 Jul 2024 12:00), Max: 36.6 (07 Jul 2024 16:00)  T(F): 97.9 (08 Jul 2024 12:00), Max: 97.9 (07 Jul 2024 16:00)  HR: 125 (08 Jul 2024 14:00) (93 - 134)  BP: 99/63 (08 Jul 2024 14:00) (86/50 - 111/71)  BP(mean): 75 (08 Jul 2024 14:00) (63 - 89)  RR: 21 (08 Jul 2024 14:00) (17 - 75)  SpO2: 100% (08 Jul 2024 14:00) (97% - 100%)    Parameters below as of 08 Jul 2024 14:00  Patient On (Oxygen Delivery Method): room air    Constitutional: Awake, alert, in no acute distress.   HEENT: Normocephalic, atraumatic, LUIS, no proptosis or lid retraction.   Neck: supple, no thyromegaly or palpable thyroid nodules.  Respiratory: Lungs clear to ausculation bilaterally.   Cardiovascular: regular rhythm, normal S1 and S2, no audible murmurs.   GI: soft, non-tender, non-distended, bowel sounds present, no masses appreciated.  Extremities: No lower extremity edema, peripheral pulses present.   Skin: + Darkening over the PIP and DIP B/L as well as Darkened Lips   Psychiatric: AAO x 3. Depressed Affect    LABS  CBC - WBC/HGB/HTC/PLT: 4.87/13.7/38.3/200 (07-08-24)  BMP: Na/K/Cl/Bicarb/BUN/Cr/Gluc: 122/4.7/91/18/12/0.5/87 (07-08-24)  Anion Gap: 13 (07-08-24)  eGFR: 136 (07-08-24)  Calcium: 9.3 (07-08-24)  Phosphorus: -- (07-08-24)  Magnesium: -- (07-08-24)  LFT - Alb/Tprot/Tbili/Dbili/AlkPhos/ALT/AST: 4.6/--/0.7/--/62/54/49 (07-08-24)  PT/aPTT/INR: 14.30/37.4/1.25 (07-07-24)  Thyroid Stimulating Hormone, Serum: 2.35 (07-07-24)    CBC - WBC/HGB/HTC/PLT: 4.87/13.7/38.3/200 (07-08-24)BMP: Na/K/Cl/Bicarb/BUN/Cr/Gluc: 122/4.7/91/18/12/0.5/87 (07-08-24)  Anion Gap: 13 (07-08-24)  eGFR: 136 (07-08-24)  Calcium: 9.3 (07-08-24)  Phosphorus: -- (07-08-24)  Magnesium: -- (07-08-24)    CAPILLARY BLOOD GLUCOSE & INSULIN RECEIVED  124 mg/dL (07-07 @ 14:24)        07-07-24 @ 07:01  -  07-08-24 @ 07:00  --------------------------------------------------------  IN: 2590 mL / OUT: 1100 mL / NET: 1490 mL    07-08-24 @ 07:01  -  07-08-24 @ 14:26  --------------------------------------------------------  IN: 990 mL / OUT: 750 mL / NET: 240 mL        ASSESSMENT / RECOMMENDATIONS    A1C: BUN: 12  Creatinine: 0.5  GFR: 136  Weight: 49.9  BMI: 18.9

## 2024-07-09 NOTE — PROGRESS NOTE ADULT - SUBJECTIVE AND OBJECTIVE BOX
Nephrology progress note    Patient was seen and examined, events over the last 24 h noted .    Allergies:  No Known Allergies    Hospital Medications:   MEDICATIONS  (STANDING):  chlorhexidine 2% Cloths 1 Application(s) Topical <User Schedule>  enoxaparin Injectable 40 milliGRAM(s) SubCutaneous every 24 hours  fludroCORTISONE 0.05 milliGRAM(s) Oral daily  hydrocortisone 15 milliGRAM(s) Oral daily  hydrocortisone 5 milliGRAM(s) Oral daily  pantoprazole    Tablet 40 milliGRAM(s) Oral before breakfast  sodium bicarbonate 650 milliGRAM(s) Oral every 8 hours        VITALS:  T(F): 98.4 (07-09-24 @ 08:00), Max: 98.6 (07-08-24 @ 22:53)  HR: 109 (07-09-24 @ 08:00)  BP: 122/73 (07-09-24 @ 08:00)  RR: 18 (07-09-24 @ 08:00)  SpO2: 97% (07-09-24 @ 08:00)  Wt(kg): --    07-07 @ 07:01  -  07-08 @ 07:00  --------------------------------------------------------  IN: 2590 mL / OUT: 1100 mL / NET: 1490 mL    07-08 @ 07:01  -  07-09 @ 07:00  --------------------------------------------------------  IN: 1290 mL / OUT: 2050 mL / NET: -760 mL          PHYSICAL EXAM:  Constitutional: NAD  HEENT: anicteric sclera, oropharynx clear, MMM  Neck: No JVD  Respiratory: CTAB, no wheezes, rales or rhonchi  Cardiovascular: S1, S2, RRR  Gastrointestinal: BS+, soft, NT/ND  Extremities: No cyanosis or clubbing. No peripheral edema  :  No steven.   Skin: No rashes    LABS:  07-08    122<L>  |  91<L>  |  12  ----------------------------<  87  4.7   |  18  |  0.5<L>    Ca    9.3      08 Jul 2024 12:23  Phos  3.9     07-08  Mg     1.7     07-08    TPro  7.3  /  Alb  4.6  /  TBili  0.7  /  DBili      /  AST  49<H>  /  ALT  54<H>  /  AlkPhos  62  07-08                          13.7   4.87  )-----------( 200      ( 08 Jul 2024 04:54 )             38.3       Urine Studies:  Urinalysis Basic - ( 08 Jul 2024 12:23 )    Color:  / Appearance:  / SG:  / pH:   Gluc: 87 mg/dL / Ketone:   / Bili:  / Urobili:    Blood:  / Protein:  / Nitrite:    Leuk Esterase:  / RBC:  / WBC    Sq Epi:  / Non Sq Epi:  / Bacteria:       Sodium, Random Urine: 93.0 mmoL/L (07-07 @ 11:31)  Sodium, Random Urine: 39.0 mmoL/L (07-07 @ 05:40)  Creatinine, Random Urine: 9 mg/dL (07-07 @ 05:40)  Protein/Creatinine Ratio Calculation: <0.6 Ratio (07-07 @ 05:40)  Osmolality, Random Urine: 154 mos/kg (07-07 @ 05:40)  Potassium, Random Urine: 9 mmol/L (07-07 @ 05:40)    RADIOLOGY & ADDITIONAL STUDIES:

## 2024-07-09 NOTE — DISCHARGE NOTE PROVIDER - CARE PROVIDER_API CALL
Tobias Drake  Endocrinology/Metab/Diabetes  [address] [place] [address]  Phone: [phone]  Fax: [phone]  Follow Up Time: 2 weeks    Endocrinologist,   Follow up with endocrinologist in [facility] in 2-3 weeks  Phone: (   )    -  Fax: (   )    -  Follow Up Time:

## 2024-07-09 NOTE — DISCHARGE NOTE PROVIDER - PROVIDER TOKENS
PROVIDER:[TOKEN:[[id number]],FOLLOWUP:[2 weeks]],FREE:[LAST:[Endocrinologist],PHONE:[(   )    -],FAX:[(   )    -],ADDRESS:[Follow up with endocrinologist in [facility] in 2-3 weeks]]

## 2024-07-09 NOTE — DISCHARGE NOTE PROVIDER - NSDCMRMEDTOKEN_GEN_ALL_CORE_FT
fludrocortisone 0.1 mg oral tablet: 0.5 tab(s) orally once a day  hydrocortisone 5 mg oral tablet: 4 tab(s) orally once a day take 3 tabs in the morning and 1 tab in the afternoon

## 2024-07-09 NOTE — PROGRESS NOTE ADULT - SUBJECTIVE AND OBJECTIVE BOX
Patient is a 23y old Female who presents with a chief complaint of Hyponatremia (09 Jul 2024 13:16)    Today is hospital day 3  Patient was transferred from ICU. No acute or major events overnight. Patient was seen at bedside. Patient is HD stable, afebrile, Saturating well on RA.   ROS was negative  Patient tolerates PO, Ambulates as tolerated and has BM.    ALLERGIES:  No Known Allergies    MEDICATIONS:  STANDING MEDICATIONS  chlorhexidine 2% Cloths 1 Application(s) Topical <User Schedule>  enoxaparin Injectable 40 milliGRAM(s) SubCutaneous every 24 hours  fludroCORTISONE 0.05 milliGRAM(s) Oral daily  hydrocortisone 15 milliGRAM(s) Oral daily  hydrocortisone 5 milliGRAM(s) Oral daily  pantoprazole    Tablet 40 milliGRAM(s) Oral before breakfast  sodium bicarbonate 650 milliGRAM(s) Oral every 8 hours    PRN MEDICATIONS  acetaminophen     Tablet .. 650 milliGRAM(s) Oral every 6 hours PRN  ondansetron Injectable 4 milliGRAM(s) IV Push every 8 hours PRN      VITALS LAST 24H:  Vital Signs Last 24 Hrs  T(C): 36.9 (09 Jul 2024 08:00), Max: 37 (08 Jul 2024 22:53)  T(F): 98.4 (09 Jul 2024 08:00), Max: 98.6 (08 Jul 2024 22:53)  HR: 109 (09 Jul 2024 08:00) (74 - 109)  BP: 122/73 (09 Jul 2024 08:00) (90/55 - 122/73)  BP(mean): 67 (08 Jul 2024 19:00) (67 - 72)  RR: 18 (09 Jul 2024 08:00) (18 - 24)  SpO2: 97% (09 Jul 2024 08:00) (97% - 100%)    Parameters below as of 09 Jul 2024 08:00  Patient On (Oxygen Delivery Method): room air        PHYSICAL EXAM:  GENERAL: NAD, lying in bed comfortably  HEENT:  EOMI, Moist mucous membranes, darkening of the lips and tongue  CHEST/LUNG: Clear to auscultation bilaterally; normal respiratory effort, no coughing, wheezes, crackles, or rales.  HEART: Regular rate and rhythm  ABDOMEN: Soft, nontender, nondistended, Bowel sounds present  EXTREMITIES:  2+ Peripheral Pulses, brisk capillary refill. No lower extremity edema, clubbing, cyanosis bilaterally  NERVOUS SYSTEM:  A&Ox3, no focal deficits   SKIN: No rashes or lesions    LABS:                        13.7   4.87  )-----------( 200      ( 08 Jul 2024 04:54 )             38.3     07-09    128<L>  |  92<L>  |  17  ----------------------------<  88  4.5   |  18  |  0.5<L>    Ca    9.7      09 Jul 2024 11:17  Phos  3.9     07-08  Mg     2.0     07-09    TPro  7.3  /  Alb  4.6  /  TBili  0.7  /  DBili  x   /  AST  49<H>  /  ALT  54<H>  /  AlkPhos  62  07-08      Urinalysis Basic - ( 09 Jul 2024 11:17 )    Color: x / Appearance: x / SG: x / pH: x  Gluc: 88 mg/dL / Ketone: x  / Bili: x / Urobili: x   Blood: x / Protein: x / Nitrite: x   Leuk Esterase: x / RBC: x / WBC x   Sq Epi: x / Non Sq Epi: x / Bacteria: x            Culture - Blood (collected 07 Jul 2024 11:10)  Source: .Blood None  Preliminary Report (08 Jul 2024 20:02):    No growth at 24 hours    Culture - Blood (collected 07 Jul 2024 11:10)  Source: .Blood None  Preliminary Report (08 Jul 2024 20:02):    No growth at 24 hours    Urinalysis with Rflx Culture (collected 07 Jul 2024 05:40)

## 2024-07-10 VITALS
SYSTOLIC BLOOD PRESSURE: 95 MMHG | HEART RATE: 107 BPM | OXYGEN SATURATION: 99 % | DIASTOLIC BLOOD PRESSURE: 63 MMHG | RESPIRATION RATE: 18 BRPM | TEMPERATURE: 98 F

## 2024-07-10 LAB
ANION GAP SERPL CALC-SCNC: 11 MMOL/L — SIGNIFICANT CHANGE UP (ref 7–14)
BUN SERPL-MCNC: 19 MG/DL — SIGNIFICANT CHANGE UP (ref 10–20)
CALCIUM SERPL-MCNC: 9.3 MG/DL — SIGNIFICANT CHANGE UP (ref 8.4–10.5)
CHLORIDE SERPL-SCNC: 95 MMOL/L — LOW (ref 98–110)
CO2 SERPL-SCNC: 20 MMOL/L — SIGNIFICANT CHANGE UP (ref 17–32)
CREAT SERPL-MCNC: 0.6 MG/DL — LOW (ref 0.7–1.5)
DRUG SCREEN, SERUM: SIGNIFICANT CHANGE UP
EGFR: 130 ML/MIN/1.73M2 — SIGNIFICANT CHANGE UP
GLUCOSE SERPL-MCNC: 87 MG/DL — SIGNIFICANT CHANGE UP (ref 70–99)
HCT VFR BLD CALC: 35.3 % — LOW (ref 37–47)
HGB BLD-MCNC: 12.8 G/DL — SIGNIFICANT CHANGE UP (ref 12–16)
MAGNESIUM SERPL-MCNC: 1.6 MG/DL — LOW (ref 1.8–2.4)
MCHC RBC-ENTMCNC: 30.3 PG — SIGNIFICANT CHANGE UP (ref 27–31)
MCHC RBC-ENTMCNC: 36.3 G/DL — SIGNIFICANT CHANGE UP (ref 32–37)
MCV RBC AUTO: 83.6 FL — SIGNIFICANT CHANGE UP (ref 81–99)
NRBC # BLD: 0 /100 WBCS — SIGNIFICANT CHANGE UP (ref 0–0)
PLATELET # BLD AUTO: 216 K/UL — SIGNIFICANT CHANGE UP (ref 130–400)
PMV BLD: 11.3 FL — HIGH (ref 7.4–10.4)
POTASSIUM SERPL-MCNC: 4.2 MMOL/L — SIGNIFICANT CHANGE UP (ref 3.5–5)
POTASSIUM SERPL-SCNC: 4.2 MMOL/L — SIGNIFICANT CHANGE UP (ref 3.5–5)
RBC # BLD: 4.22 M/UL — SIGNIFICANT CHANGE UP (ref 4.2–5.4)
RBC # FLD: 12.6 % — SIGNIFICANT CHANGE UP (ref 11.5–14.5)
SODIUM SERPL-SCNC: 126 MMOL/L — LOW (ref 135–146)
WBC # BLD: 6.33 K/UL — SIGNIFICANT CHANGE UP (ref 4.8–10.8)
WBC # FLD AUTO: 6.33 K/UL — SIGNIFICANT CHANGE UP (ref 4.8–10.8)

## 2024-07-10 PROCEDURE — 99239 HOSP IP/OBS DSCHRG MGMT >30: CPT

## 2024-07-10 RX ORDER — HYDROCORTISONE 100 MG/60ML
4 SUSPENSION RECTAL
Qty: 240 | Refills: 0
Start: 2024-07-10 | End: 2024-09-07

## 2024-07-10 RX ORDER — MAGNESIUM SULFATE 100 %
2 POWDER (GRAM) MISCELLANEOUS ONCE
Refills: 0 | Status: COMPLETED | OUTPATIENT
Start: 2024-07-10 | End: 2024-07-10

## 2024-07-10 RX ADMIN — Medication 1 APPLICATION(S): at 05:44

## 2024-07-10 RX ADMIN — Medication 0.05 MILLIGRAM(S): at 05:43

## 2024-07-10 RX ADMIN — PANTOPRAZOLE SODIUM 40 MILLIGRAM(S): 40 INJECTION, POWDER, FOR SOLUTION INTRAVENOUS at 05:43

## 2024-07-10 RX ADMIN — ENOXAPARIN SODIUM 40 MILLIGRAM(S): 100 INJECTION SUBCUTANEOUS at 05:43

## 2024-07-10 RX ADMIN — HYDROCORTISONE 5 MILLIGRAM(S): 100 SUSPENSION RECTAL at 05:44

## 2024-07-10 RX ADMIN — HYDROCORTISONE 15 MILLIGRAM(S): 100 SUSPENSION RECTAL at 05:43

## 2024-07-10 RX ADMIN — Medication 25 GRAM(S): at 09:47

## 2024-07-10 RX ADMIN — SODIUM BICARBONATE 650 MILLIGRAM(S): 650 TABLET ORAL at 05:43

## 2024-07-10 NOTE — PROGRESS NOTE ADULT - SUBJECTIVE AND OBJECTIVE BOX
HPI  Patient is a 23y old Female who presents with a chief complaint of Hyponatremia (09 Jul 2024 18:43)    Currently admitted to medicine with the primary diagnosis of Primary adrenal insufficiency       Today is hospital day 3d.     INTERVAL HPI / OVERNIGHT EVENTS:  Patient was seen and examined at bedside    Patient Feels okay  No new complaints  patient flying to [place] today; D[place] plan d/w patient in detail        PAST MEDICAL & SURGICAL HISTORY  No pertinent past medical history      ALLERGIES  No Known Allergies    MEDICATIONS  STANDING MEDICATIONS  chlorhexidine 2% Cloths 1 Application(s) Topical <User Schedule>  enoxaparin Injectable 40 milliGRAM(s) SubCutaneous every 24 hours  fludroCORTISONE 0.05 milliGRAM(s) Oral daily  hydrocortisone 5 milliGRAM(s) Oral daily  hydrocortisone 15 milliGRAM(s) Oral daily  pantoprazole    Tablet 40 milliGRAM(s) Oral before breakfast  sodium bicarbonate 650 milliGRAM(s) Oral every 8 hours    PRN MEDICATIONS  acetaminophen     Tablet .. 650 milliGRAM(s) Oral every 6 hours PRN  ondansetron Injectable 4 milliGRAM(s) IV Push every 8 hours PRN    VITALS:  T(F): 97.8  HR: 107  BP: 95/63  RR: 18  SpO2: 99%    PHYSICAL EXAM  GEN: no distress, comfortable  PULM: normal respiration  EXT: No lower extremity edema  NEURO: A&Ox3, moving all extremities    LABS                        12.8   6.33  )-----------( 216      ( 10 Jul 2024 07:05 )             35.3     07-10    126<L>  |  95<L>  |  19  ----------------------------<  87  4.2   |  20  |  0.6<L>    Ca    9.3      10 Jul 2024 07:05  Mg     1.6     07-10        Urinalysis Basic - ( 10 Jul 2024 07:05 )    Color: x / Appearance: x / SG: x / pH: x  Gluc: 87 mg/dL / Ketone: x  / Bili: x / Urobili: x   Blood: x / Protein: x / Nitrite: x   Leuk Esterase: x / RBC: x / WBC x   Sq Epi: x / Non Sq Epi: x / Bacteria: x                RADIOLOGY

## 2024-07-10 NOTE — DISCHARGE NOTE NURSING/CASE MANAGEMENT/SOCIAL WORK - PATIENT PORTAL LINK FT
You can access the FollowMyHealth Patient Portal offered by [facility] by registering at the following website: [email or web address]. By joining [facility]’s FollowMyHealth portal, you will also be able to view your health information using other applications (apps) compatible with our system.

## 2024-07-10 NOTE — PROGRESS NOTE ADULT - ASSESSMENT
Discharge note reviewed and updated.   Discharge plan discussed with patient in detail  Follow up recommendation d/w patient in detail
Hypovolemic hyponatremia / iso poor po intake and vomiting / urine Na high was taken after pt received plenty of iv hydration, urine osm high for Na level/ was likely still hypovolemic  Starvation ketoacidosis  vomited yesterday likely d/t hyponatremia / drinks a lot of water on daily basis with low po intake  improving with iv hydration  - Na levels noted with overcorrection, was lowered back to 122  No more D5W  - monitor Na level qshift  - cont sodium bicarb po 650meq q8h  - will need to limit excessive fluid intake. for now restrict water to 1.5L daily  - TSH normal    will follow     
  #Symptomatic Hypovolemic hyponatremia  # HAGMA possible starvation ketoacidosis   # Mixed AG + NAG metabolic acidosis   - initial Na 110  - s/p 2L fluid bolus in the ED  Per nephrology: - Na levels noted with overcorrection, was lowered back to 122. No more D5W - monitor Na level qshift - cont sodium bicarb po 650meq q8h - will need to limit excessive fluid intake. for now restrict water to 1.5L daily  - Hyponatremia secondary to primary adrenal insufficiency  - cortisol 2.3,ACTH >2000  -Per endocrin: on 7/8 - Can give 15 mg of hydrocortisone now and start with 15 mg in the a.m. and 5 mg in the afternoon from tomorrow -Would also recommend starting with Florinef 0.05 mg once a day  - If patient becomes HD unstable, please start stress dose steroids ASAP   - BMP 7/9 shows Na 128, bicarb 18, AG closed (18)   - beta hydroxy butyrate 0.5 (1.5 on admission)   - GI PCR and stool culture (not collected)  - echo normal, EF >55%    # Depressed affect   - father passed away at [facility] due to MI one day prior to presentation   - no suicidal ideation   - possible psych consult.
22 y/o F w/ no significant PMHx presents complaining of decreased oral intake for three days, yesterday her father passed away and since then she developed nausea and vomiting with mild epigastric abdominal pain.     #Adrenal insufficiencyLow cortisol levels  -In the setting of severe hyponatremia,   -A.m. cortisol level noted to be 2.3 patient also has symptoms of nausea vomiting weight loss although she states that she has been trying to lose weight  -Has noticed some darkening of her skin over the last few weeks which is confirmed by her family  -Serum ACTH level noted to be > 2100 confirming diagnosis of primary adrenal insufficiency  -Continue with hydrocortisone 50 mg in the a.m. and 5 mg in the afternoon and Florinef 0.05 mg once a day  -Discussed diagnosis with the patient advised on importance of strict compliance with the medication, discussed sick day dosing, will need close eventual follow-up with endocrinology  - Would recommend obtaining 21-hydroxylase antibodies
IMPRESSION:    Acute hyponatremia improving  Starvation ketoacidosis   Metabolic acidosis ( AG/ NAG)  HCO3 loss from vomiting      PLAN:    CNS: Avoid depressants.     HEENT: Oral care    PULMONARY: On RA. HOB @ 45 degrees.  Aspiration precautions. on RA    CARDIOVASCULAR: Goal directed fluid resuscitation. DC D5 W, echo    GI: GI prophylaxis.  Feeding.  Bowel regimen PRN    RENAL: Avoid overcorrection. Monitor BMP. Nephrology fup. Follow up lytes. Correct as needed    INFECTIOUS DISEASE: Follow up cultures. Monitor off abx.    HEMATOLOGICAL:  DVT prophylaxis.    ENDOCRINE:  Follow up FS.  Insulin protocol if needed.    MUSCULOSKELETAL: AAT    ICU        
Hypovolemic hyponatremia / iso poor po intake and vomiting / urine Na high was taken after pt received plenty of iv hydration, urine osm high for Na level/ was likely still hypovolemic  Starvation ketoacidosis  vomited yesterday likely d/t hyponatremia / drinks a lot of water on daily basis with low po intake  improving with iv hydration  - Na levels noted with overcorrection  - D5w at 100 cc per hour / target serum sodium 122 by end of day   - monitor Na level qshift  - cont sodium bicarb po 650meq q8h  - will need to limit excessive fluid intake. for now restrict to 1.5L daily  - check TSH    will follow 
23-year-old female with no significant past medical history presents complaining of decreased oral intake for three days, yesterday her father passed away and since then she developed nausea and vomiting with mild epigastric abdominal pain. She endorses 3 episodes of NBNB emesis as well as 1 episode of watery nonbloody diarrhea but then her symptoms improved slightly and she now denies diarrhea and says the abdominal pain improved but she has significant generalized weakness.  Reports she returned from [place] on June 17 but was not feeling sick after she returned.     Pt was admitted to MICU and transferred to medicine floor on 7/8/24 evening    # Hyponatremia sec to poor oral intake and vomiting- resolving  # Starvation ketoacidosis   # Metabolic acidosis  - Thyroid Stimulating Hormone, Serum: 2.35 uIU/mL (07.07.24 @ 16:56)  - S/p IV fluids  - c/w sodium bicarb  - limit excessive fluid intake. for now restrict water to 1.5L daily  - monitor BMP    # Adrenal insufficiency  - Cortisol AM, Serum: 2.3 ug/dL (07.07.24 @ 05:35)  - Adrenocorticotropic Hormone (ACTH), Plasma: >2000.0: Test Repeated pg/mL (07.08.24 @ 16:40)  - c/w hydrocortisone  - Florinef 0.05 mg once a day  - Endocrine f/u    # Transaminitis probably sec to hypotension  - monitor LFT    # DVT prophylaxis    # Full code    # Pending: F/u BMP  # Discussed plan of care with patient  # Disposition: Home

## 2024-07-12 LAB
CULTURE RESULTS: SIGNIFICANT CHANGE UP
CULTURE RESULTS: SIGNIFICANT CHANGE UP
SPECIMEN SOURCE: SIGNIFICANT CHANGE UP
SPECIMEN SOURCE: SIGNIFICANT CHANGE UP

## 2024-07-16 LAB
CORTICOSTEROID BINDING GLOBULIN RESULT: 3 MG/DL — SIGNIFICANT CHANGE UP
CORTIS F/TOTAL MFR SERPL: 2.1 % — SIGNIFICANT CHANGE UP
CORTIS SERPL-MCNC: 3.6 UG/DL — LOW
CORTISOL, FREE RESULT: 0.07 UG/DL — LOW

## 2024-07-17 DIAGNOSIS — E87.29 OTHER ACIDOSIS: ICD-10-CM

## 2024-07-17 DIAGNOSIS — E87.1 HYPO-OSMOLALITY AND HYPONATREMIA: ICD-10-CM

## 2024-07-17 DIAGNOSIS — R74.01 ELEVATION OF LEVELS OF LIVER TRANSAMINASE LEVELS: ICD-10-CM

## 2024-07-17 DIAGNOSIS — E83.42 HYPOMAGNESEMIA: ICD-10-CM

## 2024-07-17 DIAGNOSIS — E86.1 HYPOVOLEMIA: ICD-10-CM

## 2024-07-17 DIAGNOSIS — F32.A DEPRESSION, UNSPECIFIED: ICD-10-CM

## 2024-07-17 DIAGNOSIS — E27.1 PRIMARY ADRENOCORTICAL INSUFFICIENCY: ICD-10-CM

## 2024-07-17 DIAGNOSIS — I95.9 HYPOTENSION, UNSPECIFIED: ICD-10-CM

## 2024-07-19 LAB — ADRENAL CORTEX AB SER-ACNC: POSITIVE — SIGNIFICANT CHANGE UP

## 2024-09-19 ENCOUNTER — APPOINTMENT (OUTPATIENT)
Dept: ENDOCRINOLOGY | Facility: CLINIC | Age: 24
End: 2024-09-19

## 2024-09-19 VITALS
BODY MASS INDEX: 20.73 KG/M2 | HEIGHT: 63 IN | WEIGHT: 117 LBS | HEART RATE: 86 BPM | SYSTOLIC BLOOD PRESSURE: 104 MMHG | DIASTOLIC BLOOD PRESSURE: 65 MMHG

## 2024-09-19 DIAGNOSIS — E27.1 PRIMARY ADRENOCORTICAL INSUFFICIENCY: ICD-10-CM

## 2024-09-19 DIAGNOSIS — Z00.00 ENCOUNTER FOR GENERAL ADULT MEDICAL EXAMINATION W/OUT ABNORMAL FINDINGS: ICD-10-CM

## 2024-09-19 RX ORDER — HYDROCORTISONE 5 MG/1
5 TABLET ORAL
Qty: 1 | Refills: 0 | Status: ACTIVE | COMMUNITY
Start: 2024-09-19

## 2024-09-19 RX ORDER — HYDROCORTISONE 5 MG/1
5 TABLET ORAL
Qty: 1 | Refills: 0 | Status: ACTIVE | COMMUNITY
Start: 2024-09-19 | End: 1900-01-01

## 2024-09-19 RX ORDER — FLUDROCORTISONE ACETATE 0.1 MG/1
0.1 TABLET ORAL DAILY
Refills: 1 | Status: ACTIVE | COMMUNITY

## 2024-09-19 NOTE — PHYSICAL EXAM
[No Respiratory Distress] : no respiratory distress [No Accessory Muscle Use] : no accessory muscle use [Clear to Auscultation] : lungs were clear to auscultation bilaterally

## 2024-09-19 NOTE — ADDENDUM
[FreeTextEntry1] : 24yoF no pmh here following hospitalization for NAGMA, hyponatremia, hyperkalemia, N/V dx with primary adrenal insufficiency discharged on hydrocortisone 15/5, and fludro 0.05 daily.  #primary adrenal insufficiency - Cortisol 2.3 ACTH> 2000, 21 hydroxylase antibody positive - started on hydocortisone 15 mg in the am and 5 mg in the afternoon and florinef 0.05 mg daily - tolerating the same well , does not have nausea or vomiting anymore blood pressure under good control today, states that the darkening of her skin has improved significantly -Will check ACTH plasma renin activity in the morning -Clinically does not appear to need any dose change -Discussed sick day dosing, advised to geta medic alert yefri

## 2024-09-19 NOTE — ASSESSMENT
[FreeTextEntry1] : 24yoF no pmh here following hospitalization for NAGMA, hyponatremia, hyperkalemia, N/V dx with primary adrenal insufficiency discharged on hydrocortisone 15/5, and fludro 0.05 daily.   #Primary Adrenal Insufficiency -c/w hydrocortisone 15mg Am, 5 PM and fludrocortisone 0.05 daily -f/u rpt bmp, acth, cortisol, plasma renin level, dheas levels -pt to f/u in other office per pt preference

## 2024-09-19 NOTE — HISTORY OF PRESENT ILLNESS
[FreeTextEntry1] : 24yoF no pmh here following hospitalization for NAGMA, hyponatremia, hyperkalemia, N/V dx with primary adrenal insufficiency discharged on hydrocortisone 15/5, and fludro 0.05 daily. Pt tolerating medications well, noticed skin lightening. Initial hospitalization likely induced secondary to severe stress induced following father's death day prior to presentation + N/V and recent weight loss. Denies N/V/D

## 2025-04-03 ENCOUNTER — APPOINTMENT (OUTPATIENT)
Dept: ENDOCRINOLOGY | Facility: CLINIC | Age: 25
End: 2025-04-03

## 2025-04-23 RX ORDER — HYDROCORTISONE SODIUM SUCCINATE 100 MG/2ML
100 INJECTION INTRAMUSCULAR; INTRAVENOUS
Qty: 1 | Refills: 0 | Status: ACTIVE | COMMUNITY
Start: 2025-04-23 | End: 1900-01-01